# Patient Record
Sex: FEMALE | Race: WHITE | HISPANIC OR LATINO | URBAN - METROPOLITAN AREA
[De-identification: names, ages, dates, MRNs, and addresses within clinical notes are randomized per-mention and may not be internally consistent; named-entity substitution may affect disease eponyms.]

---

## 2019-04-24 ENCOUNTER — EMERGENCY (EMERGENCY)
Facility: HOSPITAL | Age: 29
LOS: 1 days | Discharge: HOME | End: 2019-04-24
Attending: EMERGENCY MEDICINE
Payer: SELF-PAY

## 2019-04-24 VITALS
SYSTOLIC BLOOD PRESSURE: 132 MMHG | HEART RATE: 72 BPM | TEMPERATURE: 98 F | RESPIRATION RATE: 17 BRPM | WEIGHT: 169.98 LBS | HEIGHT: 63 IN | DIASTOLIC BLOOD PRESSURE: 85 MMHG | OXYGEN SATURATION: 98 %

## 2019-04-24 VITALS
HEART RATE: 77 BPM | RESPIRATION RATE: 18 BRPM | SYSTOLIC BLOOD PRESSURE: 122 MMHG | OXYGEN SATURATION: 99 % | TEMPERATURE: 98 F | DIASTOLIC BLOOD PRESSURE: 89 MMHG

## 2019-04-24 DIAGNOSIS — Z98.89 OTHER SPECIFIED POSTPROCEDURAL STATES: Chronic | ICD-10-CM

## 2019-04-24 LAB
ALBUMIN SERPL ELPH-MCNC: 4.6 G/DL — SIGNIFICANT CHANGE UP (ref 3.3–5)
ALP SERPL-CCNC: 38 U/L — LOW (ref 40–120)
ALT FLD-CCNC: 23 U/L — SIGNIFICANT CHANGE UP (ref 10–45)
ANION GAP SERPL CALC-SCNC: 11 MMOL/L — SIGNIFICANT CHANGE UP (ref 5–17)
APPEARANCE UR: ABNORMAL
AST SERPL-CCNC: 22 U/L — SIGNIFICANT CHANGE UP (ref 10–40)
BACTERIA # UR AUTO: NEGATIVE — SIGNIFICANT CHANGE UP
BASOPHILS # BLD AUTO: 0 K/UL — SIGNIFICANT CHANGE UP (ref 0–0.2)
BASOPHILS NFR BLD AUTO: 0 % — SIGNIFICANT CHANGE UP (ref 0–2)
BILIRUB SERPL-MCNC: 0.5 MG/DL — SIGNIFICANT CHANGE UP (ref 0.2–1.2)
BILIRUB UR-MCNC: NEGATIVE — SIGNIFICANT CHANGE UP
BLD GP AB SCN SERPL QL: NEGATIVE — SIGNIFICANT CHANGE UP
BUN SERPL-MCNC: 10 MG/DL — SIGNIFICANT CHANGE UP (ref 7–23)
CALCIUM SERPL-MCNC: 9.2 MG/DL — SIGNIFICANT CHANGE UP (ref 8.4–10.5)
CHLORIDE SERPL-SCNC: 103 MMOL/L — SIGNIFICANT CHANGE UP (ref 96–108)
CO2 SERPL-SCNC: 23 MMOL/L — SIGNIFICANT CHANGE UP (ref 22–31)
COLOR SPEC: ABNORMAL
CREAT SERPL-MCNC: 0.54 MG/DL — SIGNIFICANT CHANGE UP (ref 0.5–1.3)
DIFF PNL FLD: ABNORMAL
EOSINOPHIL # BLD AUTO: 0.1 K/UL — SIGNIFICANT CHANGE UP (ref 0–0.5)
EOSINOPHIL NFR BLD AUTO: 1.1 % — SIGNIFICANT CHANGE UP (ref 0–6)
EPI CELLS # UR: 5 /HPF — SIGNIFICANT CHANGE UP
GLUCOSE SERPL-MCNC: 99 MG/DL — SIGNIFICANT CHANGE UP (ref 70–99)
GLUCOSE UR QL: NEGATIVE — SIGNIFICANT CHANGE UP
HCG SERPL-ACNC: 33.8 MIU/ML — HIGH
HCT VFR BLD CALC: 40.6 % — SIGNIFICANT CHANGE UP (ref 34.5–45)
HGB BLD-MCNC: 14.1 G/DL — SIGNIFICANT CHANGE UP (ref 11.5–15.5)
HYALINE CASTS # UR AUTO: 0 /LPF — SIGNIFICANT CHANGE UP (ref 0–2)
KETONES UR-MCNC: NEGATIVE — SIGNIFICANT CHANGE UP
LEUKOCYTE ESTERASE UR-ACNC: NEGATIVE — SIGNIFICANT CHANGE UP
LYMPHOCYTES # BLD AUTO: 1.7 K/UL — SIGNIFICANT CHANGE UP (ref 1–3.3)
LYMPHOCYTES # BLD AUTO: 25.8 % — SIGNIFICANT CHANGE UP (ref 13–44)
MCHC RBC-ENTMCNC: 30.6 PG — SIGNIFICANT CHANGE UP (ref 27–34)
MCHC RBC-ENTMCNC: 34.7 GM/DL — SIGNIFICANT CHANGE UP (ref 32–36)
MCV RBC AUTO: 88 FL — SIGNIFICANT CHANGE UP (ref 80–100)
MONOCYTES # BLD AUTO: 0.3 K/UL — SIGNIFICANT CHANGE UP (ref 0–0.9)
MONOCYTES NFR BLD AUTO: 5.2 % — SIGNIFICANT CHANGE UP (ref 2–14)
NEUTROPHILS # BLD AUTO: 4.5 K/UL — SIGNIFICANT CHANGE UP (ref 1.8–7.4)
NEUTROPHILS NFR BLD AUTO: 67.9 % — SIGNIFICANT CHANGE UP (ref 43–77)
NITRITE UR-MCNC: NEGATIVE — SIGNIFICANT CHANGE UP
PH UR: 6.5 — SIGNIFICANT CHANGE UP (ref 5–8)
PLATELET # BLD AUTO: 258 K/UL — SIGNIFICANT CHANGE UP (ref 150–400)
POTASSIUM SERPL-MCNC: 4.2 MMOL/L — SIGNIFICANT CHANGE UP (ref 3.5–5.3)
POTASSIUM SERPL-SCNC: 4.2 MMOL/L — SIGNIFICANT CHANGE UP (ref 3.5–5.3)
PROT SERPL-MCNC: 7.4 G/DL — SIGNIFICANT CHANGE UP (ref 6–8.3)
PROT UR-MCNC: ABNORMAL
RBC # BLD: 4.61 M/UL — SIGNIFICANT CHANGE UP (ref 3.8–5.2)
RBC # FLD: 12.2 % — SIGNIFICANT CHANGE UP (ref 10.3–14.5)
RBC CASTS # UR COMP ASSIST: 552 /HPF — HIGH (ref 0–4)
RH IG SCN BLD-IMP: POSITIVE — SIGNIFICANT CHANGE UP
SODIUM SERPL-SCNC: 137 MMOL/L — SIGNIFICANT CHANGE UP (ref 135–145)
SP GR SPEC: 1.03 — HIGH (ref 1.01–1.02)
UROBILINOGEN FLD QL: NEGATIVE — SIGNIFICANT CHANGE UP
WBC # BLD: 6.6 K/UL — SIGNIFICANT CHANGE UP (ref 3.8–10.5)
WBC # FLD AUTO: 6.6 K/UL — SIGNIFICANT CHANGE UP (ref 3.8–10.5)
WBC UR QL: 7 /HPF — HIGH (ref 0–5)

## 2019-04-24 PROCEDURE — 86900 BLOOD TYPING SEROLOGIC ABO: CPT

## 2019-04-24 PROCEDURE — 85027 COMPLETE CBC AUTOMATED: CPT

## 2019-04-24 PROCEDURE — 99284 EMERGENCY DEPT VISIT MOD MDM: CPT

## 2019-04-24 PROCEDURE — 86901 BLOOD TYPING SEROLOGIC RH(D): CPT

## 2019-04-24 PROCEDURE — 80053 COMPREHEN METABOLIC PANEL: CPT

## 2019-04-24 PROCEDURE — 87086 URINE CULTURE/COLONY COUNT: CPT

## 2019-04-24 PROCEDURE — 93975 VASCULAR STUDY: CPT

## 2019-04-24 PROCEDURE — 93975 VASCULAR STUDY: CPT | Mod: 26

## 2019-04-24 PROCEDURE — 99284 EMERGENCY DEPT VISIT MOD MDM: CPT | Mod: 25

## 2019-04-24 PROCEDURE — 86850 RBC ANTIBODY SCREEN: CPT

## 2019-04-24 PROCEDURE — 76817 TRANSVAGINAL US OBSTETRIC: CPT

## 2019-04-24 PROCEDURE — 81001 URINALYSIS AUTO W/SCOPE: CPT

## 2019-04-24 PROCEDURE — 84702 CHORIONIC GONADOTROPIN TEST: CPT

## 2019-04-24 PROCEDURE — 76817 TRANSVAGINAL US OBSTETRIC: CPT | Mod: 26

## 2019-04-24 RX ORDER — ACETAMINOPHEN 500 MG
650 TABLET ORAL ONCE
Qty: 0 | Refills: 0 | Status: COMPLETED | OUTPATIENT
Start: 2019-04-24 | End: 2019-04-24

## 2019-04-24 RX ADMIN — Medication 650 MILLIGRAM(S): at 13:18

## 2019-04-24 RX ADMIN — Medication 650 MILLIGRAM(S): at 15:47

## 2019-04-24 NOTE — ED PROVIDER NOTE - CLINICAL SUMMARY MEDICAL DECISION MAKING FREE TEXT BOX
30 y/o F A1 coming in for abd pain x2days, vaginal bleeding since this AM, w tenderness to llq and suprapubic region, will get US to assess for early ectopic pregnancy and also will get labs w hcg, and ua. pain control, doubt etiology is intra-abd process, most likely to be  gynecology/ obstetrics in pathology.

## 2019-04-24 NOTE — ED PROVIDER NOTE - NSFOLLOWUPINSTRUCTIONS_ED_ALL_ED_FT
1) Please follow-up with your OBGYN or the ED in 2 days to recheck the beta-HCG to evaluate for an ectopic pregnancy.  2) Return to the Emergency Department if you experiences: fevers, chills, abdominal or pelvic pain, vaginal bleeding, dizziness, vomiting, fainting, or symptoms that are new or recurrent.

## 2019-04-24 NOTE — ED PROVIDER NOTE - OBJECTIVE STATEMENT
30 y/o A1 F w pmhx of , pshx of hernia repair as child, cholecystectomy 2 years ago, p/w epigastric pain (resolved), now mid abd pain x2days and vaginal bleeding 'light bleeding w clots' today AM. Pt notes recent positive pregnancy test , LMP 3/19, pt states she has regular menses at baseline. Abd pain described as sharp, intermittent, 7/10 in severity. +2 pads today total. Denies dizziness, lightheadedness, loc, urinary sx, cp, sob, nvd or other complaints. Not on daily meds. NKDA. Did not endorse pain meds today.

## 2019-04-24 NOTE — ED PROVIDER NOTE - CARE PLAN
Principal Discharge DX:	Abdominal pain affecting pregnancy  Secondary Diagnosis:	Vaginal bleeding affecting early pregnancy

## 2019-04-24 NOTE — CONSULT NOTE ADULT - PROBLEM SELECTOR RECOMMENDATION 9
-d/c home with close OB follow up  -Patient to follow up with Dr. Luzma Plascencia ([phone]) in 2 days for repeat bHCG and sono. If she is unable to procure an appt, she will come to the ED.  -ectopic precautions given    All questions answered, patient expressed understanding and was able to repeat plan of care to us.     D/w Dr. Humphries  Pt seen and examined with MARILIA Sage PGY2 and ALFONSO Ortega PGY4  TRE Cortez PGY1

## 2019-04-24 NOTE — ED PROVIDER NOTE - ATTENDING CONTRIBUTION TO CARE
pw vaginal bleeding and cramping today. Denies f/c/n/v/cp/sob/palpitations/ cough/rash/headache/dizziness/abd.pain/d/c/dysuria/hematuria.   abd bening   -->ectopic vs threatened  vs misscarriage--labs sono gyn

## 2019-04-24 NOTE — ED PROVIDER NOTE - NSFOLLOWUPCLINICS_GEN_ALL_ED_FT
[facility] Gynecology [facility] Obstetrics  Gynceology/OB  [address] [place] [address]  Phone: [phone]  Fax:   Follow Up Time:

## 2019-04-24 NOTE — CONSULT NOTE ADULT - ASSESSMENT
30 yo  LMP 3/19/19 p/w +bHCG and vaginal bleeding likely SAB vs pregnancy of unknown location vs early IUP. +bHCG of 33, down from >50 due to + home pregnancy test make SAB or possible ectopic more likely. Patient is hemodynamically stable and has mild pain with benign exam. TVUS shows no IUP, free fluid in pelvis, or ovarian cysts. Blood type A+, no need for Rhogam. Patient counseled that she likely has miscarried and to expect continued bleeding or abdominal pain over next few days which should be less than 2 pads per hour. Patient also counseled on ectopic precautions and management including medication vs surgery. She was also counseled on contraception and safe sex practices.

## 2019-04-24 NOTE — ED PROVIDER NOTE - PROGRESS NOTE DETAILS
Ruben Aguirre MD: results printed and d/w pt. recommended close follow-up with OBGYN for repeat HCG test. also give strict return precautions and signs Ruben Aguirre MD: pt improved symptoms. seen and cleared by OBGYN for discharge and beta-recheck to r/o ectopic. results printed and d/w pt. recommended close follow-up with OBGYN or ED for repeat HCG test in 48 hours. also give strict return precautions and signs

## 2019-04-25 LAB
CULTURE RESULTS: SIGNIFICANT CHANGE UP
SPECIMEN SOURCE: SIGNIFICANT CHANGE UP

## 2019-04-25 NOTE — ED POST DISCHARGE NOTE - ADDITIONAL DOCUMENTATION
Pt called, states someone from the [facility] tried to contact her but she missed the call, no VM left for contact. Chart reviewed, discussed that pt was instructed to come to the ED on friday if unable to make appointment with OB/Gyn. She states she was unable to make appointment with Ob/Gyn for friday for repeat Hcg, discussed to come to the emergency room on friday otherwise call Ob/Gyn office back in case they tried to contact her. No document noted on sunrise regarding missed attempt to call patient. -Jodie Kim PA-C

## 2019-04-26 ENCOUNTER — EMERGENCY (EMERGENCY)
Facility: HOSPITAL | Age: 29
LOS: 1 days | Discharge: HOME | End: 2019-04-26
Attending: EMERGENCY MEDICINE
Payer: MEDICAID

## 2019-04-26 VITALS
DIASTOLIC BLOOD PRESSURE: 80 MMHG | HEART RATE: 77 BPM | WEIGHT: 175.05 LBS | OXYGEN SATURATION: 100 % | TEMPERATURE: 98 F | HEIGHT: 63 IN | SYSTOLIC BLOOD PRESSURE: 135 MMHG | RESPIRATION RATE: 17 BRPM

## 2019-04-26 VITALS
HEART RATE: 58 BPM | OXYGEN SATURATION: 100 % | RESPIRATION RATE: 16 BRPM | TEMPERATURE: 98 F | DIASTOLIC BLOOD PRESSURE: 71 MMHG | SYSTOLIC BLOOD PRESSURE: 115 MMHG

## 2019-04-26 DIAGNOSIS — O03.9 COMPLETE OR UNSPECIFIED SPONTANEOUS ABORTION WITHOUT COMPLICATION: ICD-10-CM

## 2019-04-26 DIAGNOSIS — Z98.89 OTHER SPECIFIED POSTPROCEDURAL STATES: Chronic | ICD-10-CM

## 2019-04-26 LAB
ALBUMIN SERPL ELPH-MCNC: 4.5 G/DL — SIGNIFICANT CHANGE UP (ref 3.3–5)
ALP SERPL-CCNC: 36 U/L — LOW (ref 40–120)
ALT FLD-CCNC: 25 U/L — SIGNIFICANT CHANGE UP (ref 10–45)
ANION GAP SERPL CALC-SCNC: 11 MMOL/L — SIGNIFICANT CHANGE UP (ref 5–17)
AST SERPL-CCNC: 20 U/L — SIGNIFICANT CHANGE UP (ref 10–40)
BASOPHILS # BLD AUTO: 0 K/UL — SIGNIFICANT CHANGE UP (ref 0–0.2)
BASOPHILS NFR BLD AUTO: 0.8 % — SIGNIFICANT CHANGE UP (ref 0–2)
BILIRUB SERPL-MCNC: 0.3 MG/DL — SIGNIFICANT CHANGE UP (ref 0.2–1.2)
BUN SERPL-MCNC: 11 MG/DL — SIGNIFICANT CHANGE UP (ref 7–23)
CALCIUM SERPL-MCNC: 9.3 MG/DL — SIGNIFICANT CHANGE UP (ref 8.4–10.5)
CHLORIDE SERPL-SCNC: 100 MMOL/L — SIGNIFICANT CHANGE UP (ref 96–108)
CO2 SERPL-SCNC: 25 MMOL/L — SIGNIFICANT CHANGE UP (ref 22–31)
CREAT SERPL-MCNC: 0.58 MG/DL — SIGNIFICANT CHANGE UP (ref 0.5–1.3)
EOSINOPHIL # BLD AUTO: 0.1 K/UL — SIGNIFICANT CHANGE UP (ref 0–0.5)
EOSINOPHIL NFR BLD AUTO: 2.6 % — SIGNIFICANT CHANGE UP (ref 0–6)
GLUCOSE SERPL-MCNC: 91 MG/DL — SIGNIFICANT CHANGE UP (ref 70–99)
HCG SERPL-ACNC: 6.4 MIU/ML — HIGH
HCT VFR BLD CALC: 40.8 % — SIGNIFICANT CHANGE UP (ref 34.5–45)
HGB BLD-MCNC: 14 G/DL — SIGNIFICANT CHANGE UP (ref 11.5–15.5)
LYMPHOCYTES # BLD AUTO: 1.7 K/UL — SIGNIFICANT CHANGE UP (ref 1–3.3)
LYMPHOCYTES # BLD AUTO: 32.6 % — SIGNIFICANT CHANGE UP (ref 13–44)
MCHC RBC-ENTMCNC: 30.3 PG — SIGNIFICANT CHANGE UP (ref 27–34)
MCHC RBC-ENTMCNC: 34.4 GM/DL — SIGNIFICANT CHANGE UP (ref 32–36)
MCV RBC AUTO: 88.1 FL — SIGNIFICANT CHANGE UP (ref 80–100)
MONOCYTES # BLD AUTO: 0.3 K/UL — SIGNIFICANT CHANGE UP (ref 0–0.9)
MONOCYTES NFR BLD AUTO: 6.2 % — SIGNIFICANT CHANGE UP (ref 2–14)
NEUTROPHILS # BLD AUTO: 3.1 K/UL — SIGNIFICANT CHANGE UP (ref 1.8–7.4)
NEUTROPHILS NFR BLD AUTO: 57.9 % — SIGNIFICANT CHANGE UP (ref 43–77)
PLATELET # BLD AUTO: 281 K/UL — SIGNIFICANT CHANGE UP (ref 150–400)
POTASSIUM SERPL-MCNC: 4 MMOL/L — SIGNIFICANT CHANGE UP (ref 3.5–5.3)
POTASSIUM SERPL-SCNC: 4 MMOL/L — SIGNIFICANT CHANGE UP (ref 3.5–5.3)
PROT SERPL-MCNC: 7.3 G/DL — SIGNIFICANT CHANGE UP (ref 6–8.3)
RBC # BLD: 4.63 M/UL — SIGNIFICANT CHANGE UP (ref 3.8–5.2)
RBC # FLD: 12.2 % — SIGNIFICANT CHANGE UP (ref 10.3–14.5)
SODIUM SERPL-SCNC: 136 MMOL/L — SIGNIFICANT CHANGE UP (ref 135–145)
WBC # BLD: 5.3 K/UL — SIGNIFICANT CHANGE UP (ref 3.8–10.5)
WBC # FLD AUTO: 5.3 K/UL — SIGNIFICANT CHANGE UP (ref 3.8–10.5)

## 2019-04-26 PROCEDURE — 93975 VASCULAR STUDY: CPT

## 2019-04-26 PROCEDURE — 84702 CHORIONIC GONADOTROPIN TEST: CPT

## 2019-04-26 PROCEDURE — 76817 TRANSVAGINAL US OBSTETRIC: CPT

## 2019-04-26 PROCEDURE — 80053 COMPREHEN METABOLIC PANEL: CPT

## 2019-04-26 PROCEDURE — 85027 COMPLETE CBC AUTOMATED: CPT

## 2019-04-26 PROCEDURE — 99284 EMERGENCY DEPT VISIT MOD MDM: CPT

## 2019-04-26 PROCEDURE — 93975 VASCULAR STUDY: CPT | Mod: 26

## 2019-04-26 PROCEDURE — 76817 TRANSVAGINAL US OBSTETRIC: CPT | Mod: 26

## 2019-04-26 NOTE — ED PROVIDER NOTE - CARE PLAN
Principal Discharge DX:	  Assessment and plan of treatment:	- stay hydrated, take tylenol 975mg every 6 hours as needed for pain   - follow up with your ob/gyn on monday as scheduled.   .- return if symptoms worsen, fevers, chills, worsening pain, worsening bleeding, difficulty ambulating and all other concerns.

## 2019-04-26 NOTE — CONSULT NOTE ADULT - SUBJECTIVE AND OBJECTIVE BOX
29 year old  for follow up [facility]. Patient seen on  with spotting and cramping. No symptoms since the last visit.   Patient is following up with Dr. Plascencia .    Name of GYN Physician: Unknown    POB:  2.5 years ago, +CG prior to laparoscopic cholecystectomy. She then had a D&C outpatient two to three weeks later. Believes she was about 10-12 weeks at that time.     PGyn: Herpes dx'd three years ago by OB, no lesions that she knows of, no antivirals. Denies fibroids, cysts, endometriosis, Abnormal pap smears (last pap 3 years ago). Never used hormonal contraception, condom use inconsistent.    Home meds: None    Allergies: No Known Allergies     PAST MEDICAL & SURGICAL HISTORY:    No pertinent past medical history  No significant past surgical history  H/O hernia repair: age 8      REVIEW OF SYSTEMS  Negative with the exception of the above    MEDICATIONS  (STANDING):    MEDICATIONS  (PRN):      Allergies    No Known Allergies    Intolerances        SOCIAL HISTORY:    FAMILY HISTORY:  No pertinent family history in first degree relatives      Vital Signs Last 24 Hrs  T(C): 36.9 (2019 13:40), Max: 36.9 (2019 12:12)  T(F): 98.4 (2019 13:40), Max: 98.4 (2019 12:12)  HR: 58 (2019 13:40) (58 - 77)  BP: 115/71 (2019 13:40) (115/71 - 135/80)  BP(mean): --  RR: 16 (2019 13:40) (16 - 17)  SpO2: 100% (2019 13:40) (100% - 100%)    PHYSICAL EXAM:      Constitutional: alert and oriented x 3    Breasts: no tenderness, no nodules    Respiratory: clear    Cardiovascular: regular rate and rhythm    Gastrointestinal: soft, non tender, + bowel sounds. No hepatosplenomegaly, no palpable masses    Genitourinary: NEFG  Cervix: closed/ long, no CMT  Uterus: normal size, non tender  Adnexa: non tender, no palpable masses            LABS:                        14.0   5.3   )-----------( 281      ( 2019 13:49 )             40.8     04-    136  |  100  |  11  ----------------------------<  91  4.0   |  25  |  0.58    Ca    9.3      2019 13:49    TPro  7.3  /  Alb  4.5  /  TBili  0.3  /  DBili  x   /  AST  20  /  ALT  25  /  AlkPhos  36<L>      HCG Quantitative, Serum: 6.4: HCG-Quantitative test interpretations: This test is intended only for the  detection & monitoring of pregnancy. For oncology studies order the tumor  marker test “HCG-TM” (hCG-Tumor Marker).  For pregnancy evaluation the reference values are as follows:  Negative:  <=4 mIU/mL  Indeterminate:  5 - 25 mIU/mL (suggest repeat testing in 72 hours)  Positive:  > 25 mIU/mL  Reference Values during Pregnancy:  Weeks after LMP* REFERENCE INTERVAL mIU/mL     3      6 - 71     4      10 - 750     5      220 - 7,100     6      160 - 32,000     7      3,700 - 164,000     8      32,000 - 150,000     9      64,000 - 151,000     10      47,000 - 187,000     12      28,000 - 211,000     14      14,000 - 63,000     15      12,000 - 71,000     16 - 18  8,000 - 58,000  * LMP:  Last Menstrual Period  HCG results of false positive and false negative for pregnancy are rare,  but can occur with this, and other, hCG tests. Marisol- and post-menopausal  females may have mildly elevated hCG concentrations usually less than 14  mIU/mL that are constant over time. mIU/mL (19 @ 13:49)              RADIOLOGY & ADDITIONAL STUDIES:

## 2019-04-26 NOTE — ED PROVIDER NOTE - PHYSICAL EXAMINATION
A&Ox3, NAD. NCAT. PERRL, EOMI. Neck supple, no LAD. Lungs CTAB. +S1S2, RRR, No m/r/g. Abd soft, minimal left lower abdominal tenderness to palpation, ND, +BS, no rebound or guarding. Extremities: cap refill <2, pulses in distal extremities 4+, no edema. Skin without rash. CN II-XII intact. Strength 5/5 UE/LE. Sensations intact throughout. Gait steady. A&Ox3, NAD. NCAT. PERRL, EOMI. Neck supple, no LAD. Lungs CTAB. +S1S2, RRR, No m/r/g. Abd soft, minimal left lower abdominal tenderness to palpation, ND, +BS, no rebound or guarding. Extremities: cap refill <2, pulses in distal extremities 4+, no edema. Skin without rash. CN II-XII intact. Strength 5/5 UE/LE. Sensations intact throughout. Gait steady. Gu exam deferred, pt evaluated by ob/gyn.

## 2019-04-26 NOTE — ED ADULT TRIAGE NOTE - CHIEF COMPLAINT QUOTE
patient seen here 2 days ago for vaginal bleeding (possible ectopic), could not get OB appointment until Monday. as per patient, was told by ED MD to come to ED if unable to get an appointment for today.

## 2019-04-26 NOTE — ED PROVIDER NOTE - NSFOLLOWUPINSTRUCTIONS_ED_ALL_ED_FT
- stay hydrated, take tylenol 975mg every 6 hours as needed for pain   - follow up with your ob/gyn on monday as scheduled.   .- return if symptoms worsen, fevers, chills, worsening pain, worsening bleeding, difficulty ambulating and all other concerns.

## 2019-04-26 NOTE — ED PROVIDER NOTE - OBJECTIVE STATEMENT
30 y/o A1 F w pmhx of , pshx of hernia repair as child, cholecystectomy 2 years ago, presenting for repeat Hcg and vaginal sonogram after noted elevated hcg 2 days ago with abdominal pain and vginal bleeding. Pt notes recent positive pregnancy test , LMP 3/19, 30 y/o A1 F w pmhx of , pshx of hernia repair as child, cholecystectomy 2 years ago, presenting for 2 day ectopic rule out, repeat Hcg and vaginal sonogram after noted elevated hcg 2 days ago with abdominal pain and vaginal bleeding. Pt notes recent positive pregnancy test , LMP 3/19, had epigastric and lower abdominal cramping with associated vaginal bleeding starting 4-5 days ago. since last visit, abd cramping and vaginal bleeding persistent however much reduced. no fevers, chills ,nausea, vomiting, diarrhea, constipation, no dizziness, lightheadedness, has not taken anything for pain, has appt with ob dr. osuna on .

## 2019-04-26 NOTE — ED PROVIDER NOTE - CARE PROVIDER_API CALL
Luzma Plascencia)  OBSN  General  [address] [address]  Phone: [phone]  Fax: [phone]  Follow Up Time: 1-3 Days

## 2019-04-26 NOTE — ED PROVIDER NOTE - ATTENDING CONTRIBUTION TO CARE
29F, , seen at ED 2 days ago for possible ectopic vs sab, returns for repeat bloodwork and us. pt was having lower abd cramping, vag bleeding which have both improved. now using only ~2 pads/day. denies f/c, n/v, lightheadedness, dizziness, cp, sob, or any other complaints.    PE: Well appearing young female in NAD, NCAT, MMM, Trachea midline, Normal conjunctiva, lungs CTAB, S1/S2 RRR, Normal perfusion, 2+ radial pulses bilat, Abdomen Soft, Minimal suprapubic ttp, No rebound/guarding, No LE edema, No deformity of extremities, No rashes,  No focal motor or sensory deficits.     29F returns for TVUS and repeat b-hcg. sx of abd cramping, vag bleeding improving. pt feels well. to perform bloodwork, check tvus, consult obgyn. - Rashi Avelar MD

## 2019-04-26 NOTE — CONSULT NOTE ADULT - PROBLEM SELECTOR RECOMMENDATION 9
Patient counseled that with decreasing BHCG she likely has an SAB. Patient will follow up Monday at [address].     dw Dr. Temo Ortega PGY4

## 2019-04-26 NOTE — ED ADULT NURSE NOTE - OBJECTIVE STATEMENT
29 year old A&Ox3 female  presents ambulatory with steady gait to ED complaining of abdominal pain. Patient seen here 2 days ago, was told "I might have been having an ectopic pregnancy." Patient was dc and told to follow up with OB but if she could not get an appointment to come back to the ED for repeat HCG levels. Patient complaining of minimal lower abdominal pain, declines pain medication at this time. Confirms using 2 pads per day, with some clots, states, "it is was less than before." LMP . Denies dizziness, lightheadedness, fevers, chills, CP, SOB, urinary symptoms, n/v/d, numbness tingling in upper and lower extremities, HA, blurry vision. VSS updated on plan of care.

## 2019-04-28 ENCOUNTER — LABORATORY RESULT (OUTPATIENT)
Age: 29
End: 2019-04-28

## 2019-04-29 ENCOUNTER — APPOINTMENT (OUTPATIENT)
Dept: OBGYN | Facility: CLINIC | Age: 29
End: 2019-04-29
Payer: MEDICAID

## 2019-04-29 ENCOUNTER — OUTPATIENT (OUTPATIENT)
Dept: OUTPATIENT SERVICES | Facility: HOSPITAL | Age: 29
LOS: 1 days | End: 2019-04-29
Payer: MEDICAID

## 2019-04-29 VITALS — SYSTOLIC BLOOD PRESSURE: 100 MMHG | WEIGHT: 173 LBS | DIASTOLIC BLOOD PRESSURE: 67 MMHG

## 2019-04-29 DIAGNOSIS — Z98.89 OTHER SPECIFIED POSTPROCEDURAL STATES: Chronic | ICD-10-CM

## 2019-04-29 PROCEDURE — 99203 OFFICE O/P NEW LOW 30 MIN: CPT | Mod: NC

## 2019-04-29 PROCEDURE — 88141 CYTOPATH C/V INTERPRET: CPT

## 2019-04-30 DIAGNOSIS — O03.9 COMPLETE OR UNSPECIFIED SPONTANEOUS ABORTION WITHOUT COMPLICATION: ICD-10-CM

## 2019-04-30 LAB
C TRACH RRNA SPEC QL NAA+PROBE: SIGNIFICANT CHANGE UP
HCG SERPL-ACNC: 1 MIU/ML — SIGNIFICANT CHANGE UP
N GONORRHOEA RRNA SPEC QL NAA+PROBE: SIGNIFICANT CHANGE UP
SPECIMEN SOURCE: SIGNIFICANT CHANGE UP

## 2019-05-01 NOTE — HISTORY OF PRESENT ILLNESS
[Good] : being in good health [Healthy Diet] : a healthy diet [Regular Exercise] : regular exercise [Definite:  ___ (Date)] : the last menstrual period was [unfilled] [Normal Amount/Duration] : was of a normal amount and duration [Regular Cycle Intervals] : periods have been regular [Sexually Active] : is sexually active [Monogamous] : is monogamous [Male ___] : [unfilled] male [Spotting Between  Menses] : no spotting between menses

## 2019-05-02 ENCOUNTER — LABORATORY RESULT (OUTPATIENT)
Age: 29
End: 2019-05-02

## 2019-05-02 LAB — CYTOLOGY SPEC DOC CYTO: SIGNIFICANT CHANGE UP

## 2019-05-02 PROCEDURE — 88175 CYTOPATH C/V AUTO FLUID REDO: CPT

## 2019-05-02 PROCEDURE — G0463: CPT

## 2019-05-02 PROCEDURE — 87491 CHLMYD TRACH DNA AMP PROBE: CPT

## 2019-05-02 PROCEDURE — 87624 HPV HI-RISK TYP POOLED RSLT: CPT

## 2019-05-02 PROCEDURE — 87591 N.GONORRHOEAE DNA AMP PROB: CPT

## 2019-05-02 PROCEDURE — 36415 COLL VENOUS BLD VENIPUNCTURE: CPT

## 2019-05-02 PROCEDURE — 84702 CHORIONIC GONADOTROPIN TEST: CPT

## 2019-05-14 ENCOUNTER — APPOINTMENT (OUTPATIENT)
Dept: OBGYN | Facility: CLINIC | Age: 29
End: 2019-05-14

## 2019-05-15 ENCOUNTER — OUTPATIENT (OUTPATIENT)
Dept: OUTPATIENT SERVICES | Facility: HOSPITAL | Age: 29
LOS: 1 days | End: 2019-05-15
Payer: MEDICAID

## 2019-05-15 ENCOUNTER — RESULT CHARGE (OUTPATIENT)
Age: 29
End: 2019-05-15

## 2019-05-15 ENCOUNTER — APPOINTMENT (OUTPATIENT)
Dept: OBGYN | Facility: CLINIC | Age: 29
End: 2019-05-15
Payer: MEDICAID

## 2019-05-15 ENCOUNTER — LABORATORY RESULT (OUTPATIENT)
Age: 29
End: 2019-05-15

## 2019-05-15 DIAGNOSIS — Z00.00 ENCOUNTER FOR GENERAL ADULT MEDICAL EXAMINATION W/OUT ABNORMAL FINDINGS: ICD-10-CM

## 2019-05-15 DIAGNOSIS — Z98.89 OTHER SPECIFIED POSTPROCEDURAL STATES: Chronic | ICD-10-CM

## 2019-05-15 DIAGNOSIS — N76.0 ACUTE VAGINITIS: ICD-10-CM

## 2019-05-15 LAB
HCG UR QL: NEGATIVE
QUALITY CONTROL: YES

## 2019-05-15 PROCEDURE — 57421 EXAM/BIOPSY OF VAG W/SCOPE: CPT | Mod: GC

## 2019-05-15 PROCEDURE — 57421 EXAM/BIOPSY OF VAG W/SCOPE: CPT

## 2019-05-15 NOTE — PROCEDURE
[Colposcopy] : colposcopy [ASCUS] : atypical squamous cells of undetermined significance [HPV high risk] : PCR positive for high risk HPV [Patient] : patient [Risks] : risks [Benefits] : benefits [Alternatives] : alternatives [Bleeding] : bleeding [Infection] : infection [No Abnormalities] : no abnormalities [Acetowhite ___ o'clock] : ascetowhite changes at [unfilled] ~Uo'clock [Consent Obtained] : written consent was obtained prior to the procedure [Biopsies Taken: # ___] : [unfilled] biopsies taken of the cervix [Biopsy Locations ___ o'clock] : the biopsies were taken at [unfilled] o'clock [Ted's] : Ted's solution [Tolerated Well] : the patient tolerated the procedure well [No Complications] : there were no complications

## 2019-05-16 NOTE — ASSESSMENT
[FreeTextEntry1] : 28yo with ASCUS +HPV. Biopsy taken at 11, 2, and 6.\par \par seen with Dr. Salinas\par gchow pgy4

## 2019-05-24 DIAGNOSIS — R87.610 ATYPICAL SQUAMOUS CELLS OF UNDETERMINED SIGNIFICANCE ON CYTOLOGIC SMEAR OF CERVIX (ASC-US): ICD-10-CM

## 2019-06-05 ENCOUNTER — RESULT CHARGE (OUTPATIENT)
Age: 29
End: 2019-06-05

## 2019-06-05 ENCOUNTER — LABORATORY RESULT (OUTPATIENT)
Age: 29
End: 2019-06-05

## 2019-06-05 ENCOUNTER — OUTPATIENT (OUTPATIENT)
Dept: OUTPATIENT SERVICES | Facility: HOSPITAL | Age: 29
LOS: 1 days | End: 2019-06-05
Payer: MEDICAID

## 2019-06-05 ENCOUNTER — APPOINTMENT (OUTPATIENT)
Dept: OBGYN | Facility: CLINIC | Age: 29
End: 2019-06-05
Payer: MEDICAID

## 2019-06-05 DIAGNOSIS — R87.810 ATYPICAL SQUAMOUS CELLS OF UNDETERMINED SIGNIFICANCE ON CYTOLOGIC SMEAR OF CERVIX (ASC-US): ICD-10-CM

## 2019-06-05 DIAGNOSIS — Z98.89 OTHER SPECIFIED POSTPROCEDURAL STATES: Chronic | ICD-10-CM

## 2019-06-05 DIAGNOSIS — N76.0 ACUTE VAGINITIS: ICD-10-CM

## 2019-06-05 DIAGNOSIS — R87.610 ATYPICAL SQUAMOUS CELLS OF UNDETERMINED SIGNIFICANCE ON CYTOLOGIC SMEAR OF CERVIX (ASC-US): ICD-10-CM

## 2019-06-05 LAB
HCG UR QL: NEGATIVE
QUALITY CONTROL: YES

## 2019-06-05 PROCEDURE — 57460 BX OF CERVIX W/SCOPE LEEP: CPT

## 2019-06-05 PROCEDURE — 57460 BX OF CERVIX W/SCOPE LEEP: CPT | Mod: GC

## 2019-06-05 NOTE — PROCEDURE
[Colposcopy With Loop Electrode Excision Of The Cervix] : colposcopy with loop electrode excision of the cervix [Risks] : risks [Benefits] : benefits [Patient] : patient [Infection] : infection [Bleeding] : bleeding [CONSENT OBTAINED] : written consent was obtained prior to the procedure. [Neg Pregnancy Test] : a pregnancy test was negative [No Premedication] : No premedication [No Abnormalities] : no abnormalities seen [Cutting Setting  ___ thomson] : the cutting setting was [unfilled] thomson [Coagulation] : ball electrode using coagulation [Sent to Path] : the excised lesion was place in buffered formalin and sent for pathlogy [Tolerated Well] : the patient tolerated the procedure well [No Complications] : there were no complications [de-identified] : HONEY 2

## 2019-06-05 NOTE — ASSESSMENT
[FreeTextEntry1] : 28yo with ASCUS +HPV, s/p colpo with path showing CIN2. LEEP performed today.\par \par seen with Dr. Salinas\par Юлия R4

## 2019-06-11 DIAGNOSIS — N87.1 MODERATE CERVICAL DYSPLASIA: ICD-10-CM

## 2019-06-18 ENCOUNTER — APPOINTMENT (OUTPATIENT)
Dept: OBGYN | Facility: CLINIC | Age: 29
End: 2019-06-18

## 2019-06-20 ENCOUNTER — APPOINTMENT (OUTPATIENT)
Dept: OBGYN | Facility: CLINIC | Age: 29
End: 2019-06-20

## 2019-06-24 ENCOUNTER — APPOINTMENT (OUTPATIENT)
Dept: OBGYN | Facility: CLINIC | Age: 29
End: 2019-06-24

## 2019-07-28 NOTE — ED ADULT TRIAGE NOTE - PAIN: PRESENCE, MLM
[FreeTextEntry1] : General: Patient is awake and alert and in no acute distress. oriented to person, place, and time. well developed, well nourished, cooperative. \par \par Skin: The skin is intact, warm, pink, and dry over the area examined. \par \par Eyes: normal conjunctiva, normal eyelids and pupils were equal and round. \par \par ENT: normal ears, normal nose and normal lips.\par \par Cardiovascular: There is brisk capillary refill in the digits of the affected extremity. They are symmetric pulses in the bilateral upper and lower extremities, positive peripheral pulses, brisk capillary refill, but no peripheral edema.\par \par Respiratory: The patient is in no apparent respiratory distress. They're taking full deep breaths without use of accessory muscles or evidence of audible wheezes or stridor without the use of a stethoscope, normal respiratory effort. \par \par Examination of both the upper and lower extremities did not show any obvious abnormality. There is no gross deformity. Patient has full range of motion of both the hips, knees, ankles, wrists, elbows, and shoulders. Neck range of motion is full and free without any pain or spasm. \par \par Examination of the back reveals shoulder asymmetry with left shoulder slightly higher than right. The pelvis is slightly asymmetric. On forward bending Romero test, the ATR measures 7 degrees. Patient is able to bend forward and touch the toes as well bend backwards without pain. Lateral flexion is symmetrical and is pain free. Straight leg raising test is free to more than 70 degrees. \par \par Neurological examination reveals a grade 5/5 muscle power. Sensation is intact to crude touch and pinprick. Deep tendon reflexes are 1+ with ankle jerk and knee jerk. The plantars are bilaterally down going. Superficial abdominal reflexes are symmetric and intact. The biceps and triceps reflexes are 1+. \par  \par There is no hairy patch, lipoma, sinus in the back. There is no pes cavus, asymmetry of calves, significant leg length discrepancy or significant cafe-au-lait spots.\par \par Child is able to walk on tiptoes as well as heels without difficulty or pain. Child is able to jump and squat without difficulty.\par \par L shoulder\par No sweling\par Generalized discomfort with ROM\par Equivocal apprehension - more for discomfort than instability \par Negative sulcus sign\par 5/5 strength, RP 2+, Brisk cap refill\par NVI r/u/m/ain distribution complains of pain/discomfort

## 2019-09-16 ENCOUNTER — APPOINTMENT (OUTPATIENT)
Dept: OBGYN | Facility: CLINIC | Age: 29
End: 2019-09-16

## 2020-02-10 ENCOUNTER — APPOINTMENT (OUTPATIENT)
Dept: OBGYN | Facility: CLINIC | Age: 30
End: 2020-02-10

## 2020-08-14 ENCOUNTER — EMERGENCY (EMERGENCY)
Facility: HOSPITAL | Age: 30
LOS: 1 days | Discharge: HOME | End: 2020-08-14
Attending: PERSONAL EMERGENCY RESPONSE ATTENDANT
Payer: MEDICAID

## 2020-08-14 VITALS
HEART RATE: 55 BPM | TEMPERATURE: 99 F | RESPIRATION RATE: 16 BRPM | OXYGEN SATURATION: 100 % | DIASTOLIC BLOOD PRESSURE: 90 MMHG | WEIGHT: 184.97 LBS | HEIGHT: 63 IN | SYSTOLIC BLOOD PRESSURE: 143 MMHG

## 2020-08-14 DIAGNOSIS — Z98.89 OTHER SPECIFIED POSTPROCEDURAL STATES: Chronic | ICD-10-CM

## 2020-08-14 LAB
ALBUMIN SERPL ELPH-MCNC: 5 G/DL — SIGNIFICANT CHANGE UP (ref 3.3–5)
ALP SERPL-CCNC: 42 U/L — SIGNIFICANT CHANGE UP (ref 40–120)
ALT FLD-CCNC: 69 U/L — HIGH (ref 10–45)
ANION GAP SERPL CALC-SCNC: 13 MMOL/L — SIGNIFICANT CHANGE UP (ref 5–17)
APPEARANCE UR: ABNORMAL
AST SERPL-CCNC: 40 U/L — SIGNIFICANT CHANGE UP (ref 10–40)
BACTERIA # UR AUTO: NEGATIVE — SIGNIFICANT CHANGE UP
BASOPHILS # BLD AUTO: 0.01 K/UL — SIGNIFICANT CHANGE UP (ref 0–0.2)
BASOPHILS NFR BLD AUTO: 0.1 % — SIGNIFICANT CHANGE UP (ref 0–2)
BILIRUB SERPL-MCNC: 0.5 MG/DL — SIGNIFICANT CHANGE UP (ref 0.2–1.2)
BILIRUB UR-MCNC: NEGATIVE — SIGNIFICANT CHANGE UP
BUN SERPL-MCNC: 11 MG/DL — SIGNIFICANT CHANGE UP (ref 7–23)
CALCIUM SERPL-MCNC: 10 MG/DL — SIGNIFICANT CHANGE UP (ref 8.4–10.5)
CHLORIDE SERPL-SCNC: 101 MMOL/L — SIGNIFICANT CHANGE UP (ref 96–108)
CO2 SERPL-SCNC: 23 MMOL/L — SIGNIFICANT CHANGE UP (ref 22–31)
COLOR SPEC: YELLOW — SIGNIFICANT CHANGE UP
CREAT SERPL-MCNC: 0.65 MG/DL — SIGNIFICANT CHANGE UP (ref 0.5–1.3)
DIFF PNL FLD: NEGATIVE — SIGNIFICANT CHANGE UP
EOSINOPHIL # BLD AUTO: 0.06 K/UL — SIGNIFICANT CHANGE UP (ref 0–0.5)
EOSINOPHIL NFR BLD AUTO: 0.8 % — SIGNIFICANT CHANGE UP (ref 0–6)
EPI CELLS # UR: 5 /HPF — SIGNIFICANT CHANGE UP
GLUCOSE SERPL-MCNC: 92 MG/DL — SIGNIFICANT CHANGE UP (ref 70–99)
GLUCOSE UR QL: NEGATIVE — SIGNIFICANT CHANGE UP
HCG SERPL-ACNC: <2 MIU/ML — SIGNIFICANT CHANGE UP
HCG UR QL: NEGATIVE — SIGNIFICANT CHANGE UP
HCT VFR BLD CALC: 45 % — SIGNIFICANT CHANGE UP (ref 34.5–45)
HGB BLD-MCNC: 14.9 G/DL — SIGNIFICANT CHANGE UP (ref 11.5–15.5)
HYALINE CASTS # UR AUTO: 3 /LPF — HIGH (ref 0–2)
IMM GRANULOCYTES NFR BLD AUTO: 0.3 % — SIGNIFICANT CHANGE UP (ref 0–1.5)
KETONES UR-MCNC: NEGATIVE — SIGNIFICANT CHANGE UP
LEUKOCYTE ESTERASE UR-ACNC: NEGATIVE — SIGNIFICANT CHANGE UP
LIDOCAIN IGE QN: 25 U/L — SIGNIFICANT CHANGE UP (ref 7–60)
LYMPHOCYTES # BLD AUTO: 2.2 K/UL — SIGNIFICANT CHANGE UP (ref 1–3.3)
LYMPHOCYTES # BLD AUTO: 29.9 % — SIGNIFICANT CHANGE UP (ref 13–44)
MCHC RBC-ENTMCNC: 29.3 PG — SIGNIFICANT CHANGE UP (ref 27–34)
MCHC RBC-ENTMCNC: 33.1 GM/DL — SIGNIFICANT CHANGE UP (ref 32–36)
MCV RBC AUTO: 88.4 FL — SIGNIFICANT CHANGE UP (ref 80–100)
MONOCYTES # BLD AUTO: 0.4 K/UL — SIGNIFICANT CHANGE UP (ref 0–0.9)
MONOCYTES NFR BLD AUTO: 5.4 % — SIGNIFICANT CHANGE UP (ref 2–14)
NEUTROPHILS # BLD AUTO: 4.66 K/UL — SIGNIFICANT CHANGE UP (ref 1.8–7.4)
NEUTROPHILS NFR BLD AUTO: 63.5 % — SIGNIFICANT CHANGE UP (ref 43–77)
NITRITE UR-MCNC: NEGATIVE — SIGNIFICANT CHANGE UP
NRBC # BLD: 0 /100 WBCS — SIGNIFICANT CHANGE UP (ref 0–0)
PH UR: 6 — SIGNIFICANT CHANGE UP (ref 5–8)
PLATELET # BLD AUTO: 267 K/UL — SIGNIFICANT CHANGE UP (ref 150–400)
POTASSIUM SERPL-MCNC: 4 MMOL/L — SIGNIFICANT CHANGE UP (ref 3.5–5.3)
POTASSIUM SERPL-SCNC: 4 MMOL/L — SIGNIFICANT CHANGE UP (ref 3.5–5.3)
PROT SERPL-MCNC: 8 G/DL — SIGNIFICANT CHANGE UP (ref 6–8.3)
PROT UR-MCNC: SIGNIFICANT CHANGE UP
RBC # BLD: 5.09 M/UL — SIGNIFICANT CHANGE UP (ref 3.8–5.2)
RBC # FLD: 12.6 % — SIGNIFICANT CHANGE UP (ref 10.3–14.5)
RBC CASTS # UR COMP ASSIST: 10 /HPF — HIGH (ref 0–4)
SODIUM SERPL-SCNC: 137 MMOL/L — SIGNIFICANT CHANGE UP (ref 135–145)
SP GR SPEC: 1.03 — HIGH (ref 1.01–1.02)
UROBILINOGEN FLD QL: NEGATIVE — SIGNIFICANT CHANGE UP
WBC # BLD: 7.35 K/UL — SIGNIFICANT CHANGE UP (ref 3.8–10.5)
WBC # FLD AUTO: 7.35 K/UL — SIGNIFICANT CHANGE UP (ref 3.8–10.5)
WBC UR QL: 1 /HPF — SIGNIFICANT CHANGE UP (ref 0–5)

## 2020-08-14 PROCEDURE — 81025 URINE PREGNANCY TEST: CPT

## 2020-08-14 PROCEDURE — 99285 EMERGENCY DEPT VISIT HI MDM: CPT

## 2020-08-14 PROCEDURE — 74177 CT ABD & PELVIS W/CONTRAST: CPT | Mod: 26

## 2020-08-14 PROCEDURE — 99284 EMERGENCY DEPT VISIT MOD MDM: CPT | Mod: 25

## 2020-08-14 PROCEDURE — 80053 COMPREHEN METABOLIC PANEL: CPT

## 2020-08-14 PROCEDURE — 81001 URINALYSIS AUTO W/SCOPE: CPT

## 2020-08-14 PROCEDURE — 76700 US EXAM ABDOM COMPLETE: CPT

## 2020-08-14 PROCEDURE — 74177 CT ABD & PELVIS W/CONTRAST: CPT

## 2020-08-14 PROCEDURE — 85027 COMPLETE CBC AUTOMATED: CPT

## 2020-08-14 PROCEDURE — 84702 CHORIONIC GONADOTROPIN TEST: CPT

## 2020-08-14 PROCEDURE — 96374 THER/PROPH/DIAG INJ IV PUSH: CPT | Mod: XU

## 2020-08-14 PROCEDURE — 76700 US EXAM ABDOM COMPLETE: CPT | Mod: 26

## 2020-08-14 PROCEDURE — 83690 ASSAY OF LIPASE: CPT

## 2020-08-14 RX ORDER — SODIUM CHLORIDE 9 MG/ML
1000 INJECTION INTRAMUSCULAR; INTRAVENOUS; SUBCUTANEOUS ONCE
Refills: 0 | Status: COMPLETED | OUTPATIENT
Start: 2020-08-14 | End: 2020-08-14

## 2020-08-14 RX ORDER — ACETAMINOPHEN 500 MG
975 TABLET ORAL ONCE
Refills: 0 | Status: COMPLETED | OUTPATIENT
Start: 2020-08-14 | End: 2020-08-14

## 2020-08-14 RX ORDER — MORPHINE SULFATE 50 MG/1
4 CAPSULE, EXTENDED RELEASE ORAL ONCE
Refills: 0 | Status: DISCONTINUED | OUTPATIENT
Start: 2020-08-14 | End: 2020-08-14

## 2020-08-14 RX ORDER — ONDANSETRON 8 MG/1
4 TABLET, FILM COATED ORAL ONCE
Refills: 0 | Status: COMPLETED | OUTPATIENT
Start: 2020-08-14 | End: 2020-08-14

## 2020-08-14 RX ADMIN — Medication 975 MILLIGRAM(S): at 21:02

## 2020-08-14 RX ADMIN — ONDANSETRON 4 MILLIGRAM(S): 8 TABLET, FILM COATED ORAL at 19:38

## 2020-08-14 RX ADMIN — SODIUM CHLORIDE 1000 MILLILITER(S): 9 INJECTION INTRAMUSCULAR; INTRAVENOUS; SUBCUTANEOUS at 21:02

## 2020-08-14 RX ADMIN — Medication 975 MILLIGRAM(S): at 20:11

## 2020-08-14 RX ADMIN — SODIUM CHLORIDE 1000 MILLILITER(S): 9 INJECTION INTRAMUSCULAR; INTRAVENOUS; SUBCUTANEOUS at 19:37

## 2020-08-14 NOTE — ED ADULT NURSE NOTE - EXPLANATION OF PATIENT'S REASON FOR LEAVING
Pt lives in [place]. Pt friend come to pick pt up. Pt unable to wait any longer. MD Bowman explained risks and benefits to leaving. Pt verbalized understanding.

## 2020-08-14 NOTE — ED PROVIDER NOTE - ADDITIONAL NOTES AND INSTRUCTIONS:
Please excuse Ms. Staton from work on 8/14 and 8/15 as she required emergency medical care.  Thank you in advance for your understanding in this matter.  She is medically cleared to return to work on or after 8/16 as tolerated.

## 2020-08-14 NOTE — ED PROVIDER NOTE - ATTENDING CONTRIBUTION TO CARE
Attending MD Bowman.  Agree with above.  Pt with limited PMHx s/p cholecystectomy with RUQ pain radiating to back with vague nausea.  Pt with focal TTP over RUQ.  Concern for stone in CBD vs SBO.  Planned sono/CTAP. Attending MD Bowman.  Agree with above.  PT is a 31 yo female with pmhx of cholecystectomy 3 yrs ago and remote hernia repair as a child who presents to ED with complaint of RUQ pain since 2300 last night.  Pain intermittent to LUQ and back assoc with nausea.  No assoc fevers/vomiting.  Since surg 3 yrs ago has chronic diarrhea 5-6x/day without changes.  Pt does not believe she is pregnant.  Planned blood work including lipase and imaging.  Pt with limited PMHx s/p cholecystectomy with RUQ pain radiating to back with vague nausea.  Pt with focal TTP over RUQ.  Concern for stone in CBD vs SBO.  Planned sono/CTAP.

## 2020-08-14 NOTE — ED PROVIDER NOTE - PATIENT PORTAL LINK FT
You can access the FollowMyHealth Patient Portal offered by [facility] by registering at the following website: [email or web address]. By joining [employer]’s FollowMyHealth portal, you will also be able to view your health information using other applications (apps) compatible with our system.

## 2020-08-14 NOTE — ED ADULT NURSE NOTE - OBJECTIVE STATEMENT
30 y F with a PMhx of gallbladder removal 3 years ago presents to the ED with RUQ abdominal pain since 11pm last night. Reports that she has some nausea. She reports that her pain is constant. Reports watery diarrhea since she had her gallbladder removed. Denies recent antibiotic use. On assessment, A&Ox3. Denies lightheadedness, dizziness, headaches, numbness and tingling. Breathing spontaneously and unlabored. Sating 99% on Room air. Denies cough, SOB and CP. S1 and S2 heard. No Peripheral edema. Cap refill 2s. No JVD. Peripheral pulses strong and equal bilaterally. Denies CP, SOB and palpitations. Abdomen soft, nondistended, negative CVA tenderness, positive bowel sounds in all four quadrants. Pt is continent. Denies dysuria, melena and hematuria. IV placed 20g in LAC. Labs drawn and sent. Pt safety maintained. Call bell within reach. Side rails in upward position. Pt awaiting dispo.

## 2020-08-14 NOTE — ED ADULT TRIAGE NOTE - CHIEF COMPLAINT QUOTE
RUQ abd pain radiating to middle back since 11 PM last night. hx cholecystectomy. + nausea/diarrhea.

## 2020-08-14 NOTE — ED PROVIDER NOTE - NSFOLLOWUPINSTRUCTIONS_ED_ALL_ED_FT
1.  Please follow-up with your primary care doctor in 1-3 days for reassessment.   2.  Your labs and ultrasound did not demonstrate significantly concerning findings, your CT scan has not resulted at the time of discharge.  You may not receive a call this evening if you have left the department before results return.  You are welcome to call tomorrow to attempt to obtain results.    3.  Discharge against medical advise does not change your ability to be seen in the future.  You are more than welcome to return to the ER should you change your mind and wish to complete or continue your care or should your symptoms change in any way.

## 2020-08-14 NOTE — ED ADULT NURSE NOTE - NSIMPLEMENTINTERV_GEN_ALL_ED
Implemented All Universal Safety Interventions:  [place] to call system. Call bell, personal items and telephone within reach. Instruct patient to call for assistance. Room bathroom lighting operational. Non-slip footwear when patient is off stretcher. Physically safe environment: no spills, clutter or unnecessary equipment. Stretcher in lowest position, wheels locked, appropriate side rails in place.

## 2020-08-14 NOTE — ED PROVIDER NOTE - PROGRESS NOTE DETAILS
Attending MD Bowman.  PT stating that she would like to leave.  I have reviewed results of labs/sono that are inconsistent with acutely actionable findings however pt has a CT pending which has not yet been read.  She has been made aware of risks of missed findings that might change her management and could be important for her safety. She verbalizes understanding of this but states she would like to go and can call tomorrow for CT results.  Pt advised that even if there are significant findings this evening she may not receive a call if she is not in the department depending on when read results.  She has decisional capacity, is A & O x 4.  Pt counseled re: need to return should she change her mind and wish to complete her care as well as need to return if sxs should change or worsen.  Pt comfortable with this and requests discharge AMA.

## 2020-08-15 VITALS
SYSTOLIC BLOOD PRESSURE: 120 MMHG | DIASTOLIC BLOOD PRESSURE: 82 MMHG | OXYGEN SATURATION: 99 % | RESPIRATION RATE: 18 BRPM | HEART RATE: 56 BPM

## 2020-08-15 NOTE — ED POST DISCHARGE NOTE - ADDITIONAL DOCUMENTATION
pt called for CT scan results. pt informed of results, and addressed all questions/concerns. pt is no longer having diarrhea but still has some upper abdominal pain. pt told to return if pain worsens, fever, inability to eat/drink, and all other concerns.  pt also instructed to f/up with a Gastroenterologist. pt understands and agrees.

## 2020-11-03 NOTE — ED ADULT TRIAGE NOTE - CCCP TRG CHIEF CMPLNT
Ongoing SW/CM Assessment/Plan of Care Note     See SW/CM flowsheets for goals and other objective data.    Patient/Family discharge goal (s): AIR placement  Goal #1: Psychosocial needs assessed     Progress note: SW called directly into pt's room and spoke w/ pt. AIR placement explained and pt agreeable for SW to make referral [facility]. Referral sent via ecin to [facility]. Per pt's request, SW covering for pt's fiance alerted that pt wants fiance to also go to [facility] if AIR recommended.   Update: BRENDAN received contact info for ins cm at [phone] if needed.           abdominal pain

## 2020-12-26 ENCOUNTER — EMERGENCY (EMERGENCY)
Facility: HOSPITAL | Age: 30
LOS: 1 days | Discharge: HOME | End: 2020-12-26
Attending: EMERGENCY MEDICINE
Payer: MEDICAID

## 2020-12-26 VITALS
WEIGHT: 177.91 LBS | RESPIRATION RATE: 17 BRPM | SYSTOLIC BLOOD PRESSURE: 122 MMHG | HEIGHT: 63 IN | TEMPERATURE: 99 F | HEART RATE: 71 BPM | DIASTOLIC BLOOD PRESSURE: 84 MMHG | OXYGEN SATURATION: 95 %

## 2020-12-26 VITALS
RESPIRATION RATE: 18 BRPM | TEMPERATURE: 99 F | HEART RATE: 57 BPM | OXYGEN SATURATION: 99 % | DIASTOLIC BLOOD PRESSURE: 66 MMHG | SYSTOLIC BLOOD PRESSURE: 106 MMHG

## 2020-12-26 DIAGNOSIS — Z98.89 OTHER SPECIFIED POSTPROCEDURAL STATES: Chronic | ICD-10-CM

## 2020-12-26 LAB
ALBUMIN SERPL ELPH-MCNC: 4.3 G/DL — SIGNIFICANT CHANGE UP (ref 3.3–5)
ALP SERPL-CCNC: 48 U/L — SIGNIFICANT CHANGE UP (ref 40–120)
ALT FLD-CCNC: 43 U/L — SIGNIFICANT CHANGE UP (ref 10–45)
ANION GAP SERPL CALC-SCNC: 11 MMOL/L — SIGNIFICANT CHANGE UP (ref 5–17)
APPEARANCE UR: ABNORMAL
AST SERPL-CCNC: 25 U/L — SIGNIFICANT CHANGE UP (ref 10–40)
BACTERIA # UR AUTO: NEGATIVE — SIGNIFICANT CHANGE UP
BASOPHILS # BLD AUTO: 0.03 K/UL — SIGNIFICANT CHANGE UP (ref 0–0.2)
BASOPHILS NFR BLD AUTO: 0.4 % — SIGNIFICANT CHANGE UP (ref 0–2)
BILIRUB SERPL-MCNC: 0.2 MG/DL — SIGNIFICANT CHANGE UP (ref 0.2–1.2)
BILIRUB UR-MCNC: NEGATIVE — SIGNIFICANT CHANGE UP
BUN SERPL-MCNC: 11 MG/DL — SIGNIFICANT CHANGE UP (ref 7–23)
CALCIUM SERPL-MCNC: 9.3 MG/DL — SIGNIFICANT CHANGE UP (ref 8.4–10.5)
CHLORIDE SERPL-SCNC: 103 MMOL/L — SIGNIFICANT CHANGE UP (ref 96–108)
CO2 SERPL-SCNC: 24 MMOL/L — SIGNIFICANT CHANGE UP (ref 22–31)
COLOR SPEC: ABNORMAL
CREAT SERPL-MCNC: 0.82 MG/DL — SIGNIFICANT CHANGE UP (ref 0.5–1.3)
DIFF PNL FLD: NEGATIVE — SIGNIFICANT CHANGE UP
EOSINOPHIL # BLD AUTO: 0.12 K/UL — SIGNIFICANT CHANGE UP (ref 0–0.5)
EOSINOPHIL NFR BLD AUTO: 1.8 % — SIGNIFICANT CHANGE UP (ref 0–6)
EPI CELLS # UR: 1 /HPF — SIGNIFICANT CHANGE UP
GLUCOSE SERPL-MCNC: 99 MG/DL — SIGNIFICANT CHANGE UP (ref 70–99)
GLUCOSE UR QL: NEGATIVE — SIGNIFICANT CHANGE UP
HCT VFR BLD CALC: 44.5 % — SIGNIFICANT CHANGE UP (ref 34.5–45)
HGB BLD-MCNC: 14.9 G/DL — SIGNIFICANT CHANGE UP (ref 11.5–15.5)
IMM GRANULOCYTES NFR BLD AUTO: 0.1 % — SIGNIFICANT CHANGE UP (ref 0–1.5)
KETONES UR-MCNC: NEGATIVE — SIGNIFICANT CHANGE UP
LEUKOCYTE ESTERASE UR-ACNC: NEGATIVE — SIGNIFICANT CHANGE UP
LYMPHOCYTES # BLD AUTO: 2.65 K/UL — SIGNIFICANT CHANGE UP (ref 1–3.3)
LYMPHOCYTES # BLD AUTO: 39.6 % — SIGNIFICANT CHANGE UP (ref 13–44)
MCHC RBC-ENTMCNC: 30 PG — SIGNIFICANT CHANGE UP (ref 27–34)
MCHC RBC-ENTMCNC: 33.5 GM/DL — SIGNIFICANT CHANGE UP (ref 32–36)
MCV RBC AUTO: 89.5 FL — SIGNIFICANT CHANGE UP (ref 80–100)
MONOCYTES # BLD AUTO: 0.36 K/UL — SIGNIFICANT CHANGE UP (ref 0–0.9)
MONOCYTES NFR BLD AUTO: 5.4 % — SIGNIFICANT CHANGE UP (ref 2–14)
NEUTROPHILS # BLD AUTO: 3.52 K/UL — SIGNIFICANT CHANGE UP (ref 1.8–7.4)
NEUTROPHILS NFR BLD AUTO: 52.7 % — SIGNIFICANT CHANGE UP (ref 43–77)
NITRITE UR-MCNC: NEGATIVE — SIGNIFICANT CHANGE UP
NRBC # BLD: 0 /100 WBCS — SIGNIFICANT CHANGE UP (ref 0–0)
PH UR: 8 — SIGNIFICANT CHANGE UP (ref 5–8)
PLATELET # BLD AUTO: 264 K/UL — SIGNIFICANT CHANGE UP (ref 150–400)
POTASSIUM SERPL-MCNC: 3.8 MMOL/L — SIGNIFICANT CHANGE UP (ref 3.5–5.3)
POTASSIUM SERPL-SCNC: 3.8 MMOL/L — SIGNIFICANT CHANGE UP (ref 3.5–5.3)
PROT SERPL-MCNC: 7.3 G/DL — SIGNIFICANT CHANGE UP (ref 6–8.3)
PROT UR-MCNC: SIGNIFICANT CHANGE UP
RBC # BLD: 4.97 M/UL — SIGNIFICANT CHANGE UP (ref 3.8–5.2)
RBC # FLD: 12.9 % — SIGNIFICANT CHANGE UP (ref 10.3–14.5)
RBC CASTS # UR COMP ASSIST: 1 /HPF — SIGNIFICANT CHANGE UP (ref 0–4)
SODIUM SERPL-SCNC: 138 MMOL/L — SIGNIFICANT CHANGE UP (ref 135–145)
SP GR SPEC: 1.02 — SIGNIFICANT CHANGE UP (ref 1.01–1.02)
UROBILINOGEN FLD QL: NEGATIVE — SIGNIFICANT CHANGE UP
WBC # BLD: 6.69 K/UL — SIGNIFICANT CHANGE UP (ref 3.8–10.5)
WBC # FLD AUTO: 6.69 K/UL — SIGNIFICANT CHANGE UP (ref 3.8–10.5)
WBC UR QL: 0 /HPF — SIGNIFICANT CHANGE UP (ref 0–5)

## 2020-12-26 PROCEDURE — 83690 ASSAY OF LIPASE: CPT

## 2020-12-26 PROCEDURE — 81001 URINALYSIS AUTO W/SCOPE: CPT

## 2020-12-26 PROCEDURE — 80053 COMPREHEN METABOLIC PANEL: CPT

## 2020-12-26 PROCEDURE — 96374 THER/PROPH/DIAG INJ IV PUSH: CPT

## 2020-12-26 PROCEDURE — 85025 COMPLETE CBC W/AUTO DIFF WBC: CPT

## 2020-12-26 PROCEDURE — 99284 EMERGENCY DEPT VISIT MOD MDM: CPT

## 2020-12-26 PROCEDURE — 99284 EMERGENCY DEPT VISIT MOD MDM: CPT | Mod: 25

## 2020-12-26 PROCEDURE — 87086 URINE CULTURE/COLONY COUNT: CPT

## 2020-12-26 RX ORDER — SUCRALFATE 1 G
1 TABLET ORAL ONCE
Refills: 0 | Status: COMPLETED | OUTPATIENT
Start: 2020-12-26 | End: 2020-12-26

## 2020-12-26 RX ORDER — KETOROLAC TROMETHAMINE 30 MG/ML
15 SYRINGE (ML) INJECTION ONCE
Refills: 0 | Status: COMPLETED | OUTPATIENT
Start: 2020-12-26 | End: 2020-12-26

## 2020-12-26 RX ORDER — SODIUM CHLORIDE 9 MG/ML
1000 INJECTION INTRAMUSCULAR; INTRAVENOUS; SUBCUTANEOUS ONCE
Refills: 0 | Status: COMPLETED | OUTPATIENT
Start: 2020-12-26 | End: 2020-12-26

## 2020-12-26 RX ORDER — ONDANSETRON 8 MG/1
4 TABLET, FILM COATED ORAL ONCE
Refills: 0 | Status: COMPLETED | OUTPATIENT
Start: 2020-12-26 | End: 2020-12-26

## 2020-12-26 RX ORDER — FAMOTIDINE 10 MG/ML
20 INJECTION INTRAVENOUS ONCE
Refills: 0 | Status: COMPLETED | OUTPATIENT
Start: 2020-12-26 | End: 2020-12-26

## 2020-12-26 RX ADMIN — SODIUM CHLORIDE 1000 MILLILITER(S): 9 INJECTION INTRAMUSCULAR; INTRAVENOUS; SUBCUTANEOUS at 02:35

## 2020-12-26 RX ADMIN — FAMOTIDINE 20 MILLIGRAM(S): 10 INJECTION INTRAVENOUS at 02:46

## 2020-12-26 RX ADMIN — Medication 1 GRAM(S): at 02:45

## 2020-12-26 NOTE — ED PROVIDER NOTE - PHYSICAL EXAMINATION
GENERAL: Awake, alert, NAD  HEENT: NC/AT, moist mucous membranes, PERRL, EOMI  LUNGS: CTAB, no wheezes or crackles   CARDIAC: RRR, no m/r/g  ABDOMEN: Soft, normal BS, non tender, non distended, no rebound, no guarding  BACK: No midline spinal tenderness, no CVA tenderness  EXT: No edema, no calf tenderness, 2+ DP pulses bilaterally, no deformities.  NEURO: A&Ox3. Moving all extremities.  SKIN: Warm and dry. No rash.  PSYCH: Normal affect.     PELVIC: Chaperoned by RN - No cervical motion or adnexal tenderness, no vaginal wall erythema, no abnormal discharge. GENERAL: Awake, alert, NAD  HEENT: NC/AT, moist mucous membranes, PERRL, EOMI  LUNGS: CTAB, no wheezes or crackles   CARDIAC: RRR, no m/r/g  ABDOMEN: Soft, normal BS, non tender, non distended, no rebound, no guarding  BACK: No midline spinal tenderness, no CVA tenderness  EXT: No edema, no calf tenderness, 2+ DP pulses bilaterally, no deformities.  NEURO: A&Ox3. Moving all extremities.  SKIN: Warm and dry. No rash.  PSYCH: Normal affect.     PELVIC: Chaperoned by PANDA Conn- No cervical motion or adnexal tenderness, no vaginal wall erythema, physiologic discharge. GENERAL: Awake, alert, NAD  HEENT: NC/AT, moist mucous membranes, PERRL, EOMI  LUNGS: CTAB, no wheezes or crackles   CARDIAC: RRR, no m/r/g  ABDOMEN: Soft, normal BS, non tender, non distended, no rebound, no guarding  BACK: No midline spinal tenderness, no CVA tenderness  EXT: No edema, no calf tenderness, 2+ DP pulses bilaterally, no deformities.  NEURO: A&Ox3. Moving all extremities.  SKIN: Warm and dry. No rash.  PSYCH: Normal affect.     PELVIC: Chaperoned by PANDA Daigle- No cervical motion or adnexal tenderness, no vaginal wall erythema, physiologic discharge.

## 2020-12-26 NOTE — ED ADULT TRIAGE NOTE - CHIEF COMPLAINT QUOTE
abdominal pain primarily in the epigastric area consistently since 1900. Nausea and diarrhea. Denies vomiting.

## 2020-12-26 NOTE — ED ADULT NURSE NOTE - OBJECTIVE STATEMENT
31 y/o female PMHx hernia, s/p cholecystectomy arrives to [facility] ED by uber from home with c/o abdominal pain. Pt states had gallbladder removed 3 years ago and has intermittent epigastric pain since. Patient ate at 1900 today and with constant 8/10 epigastric cramping pain post PO intake. Patient states also has right lower abdominal pain. +nausea and diarrhea, denies vomiting. Patient is A&Ox4. Respirations spontaneous and unlabored. SOB, dyspnea, cough, chest pain, palpitations. Abdomen soft, tenderness and patient states always bloated. LMP 2 weeks ago. Denies urinary symptoms. Denies fever/chills. No sick contacts. Skin is warm/dry and normal for race.

## 2020-12-26 NOTE — ED PROVIDER NOTE - PATIENT PORTAL LINK FT
You can access the FollowMyHealth Patient Portal offered by [facility] by registering at the following website: [email or web address]. By joining [facility]’s FollowMyHealth portal, you will also be able to view your health information using other applications (apps) compatible with our system.

## 2020-12-26 NOTE — ED PROVIDER NOTE - CARE PLAN
[facility] Cardiology Progress Note    Date of service: 3/5/2020    Subjective:  Ms Neel Cardenas has no cardiac c/o.; JURGEN    Objective:    Visit Vitals  /55 (BP 1 Location: Right arm, BP Patient Position: At rest)   Pulse 64   Temp 97.7 °F (36.5 °C)   Resp 14   Ht 5' 3\" (1.6 m)   Wt 63.9 kg (140 lb 14 oz)   SpO2 95%   BMI 24.95 kg/m²       Physical Exam   Constitutional: She is oriented to person, place, and time. She appears well-developed and well-nourished. HENT:   Head: Normocephalic and atraumatic. Eyes: Conjunctivae are normal. No scleral icterus. Neck: No JVD present. Cardiovascular: Normal rate and normal heart sounds. An irregularly irregular rhythm present. Exam reveals no gallop. No murmur heard. Pulmonary/Chest: Effort normal and breath sounds normal. No stridor. No respiratory distress. She has no wheezes. She has no rales. Abdominal: Soft. She exhibits no distension. Musculoskeletal:         General: Edema present. No deformity. Neurological: She is alert and oriented to person, place, and time. Skin: Skin is warm and dry. Psychiatric: She has a normal mood and affect. Her behavior is normal.       Data reviewed:  No results found for this or any previous visit (from the past 12 hour(s)). 05/27/19   ECHO ADULT COMPLETE 05/28/2019 5/28/2019    Narrative · Pericardium: There is a moderate left pleural effusion. · Tricuspid Valve: Mild to moderate tricuspid valve regurgitation is   present. · Right Ventricle: Moderately dilated right ventricle. Mildly reduced   systolic function. · Left Atrium: Moderately dilated left atrium. · Left Ventricle: Mildly to moderately increased wall thickness. Estimated   left ventricular ejection fraction is 56 - 60%. Abnormal left ventricular   septal motion. Systolic flattening of the interventricular septum   consistent with right ventricle pressure overload. Interventricular septal   \"bounce\". Inconclusive left ventricular diastolic function.   · Pulmonary Artery: Moderate to severe pulmonary hypertension. · Aortic Valve: Mild aortic valve regurgitation is present. Signed by: Citlalli Lazcano MD         Assessment:  1. Acute on chronic diastolic heart failure, NYHA class III  Stable  2.  Other persistent atrial fibrillation  Rate controlled    Plan:    Cont with current meds  Not able to restart spironolactone as of yet Principal Discharge DX:	Abdominal pain

## 2020-12-26 NOTE — ED PROVIDER NOTE - NSFOLLOWUPINSTRUCTIONS_ED_ALL_ED_FT
You were evaluated in the emergency department for abdominal pain. The results of your bloodwork are attached.    You should follow up with a gastroenterologist. A list has been provided to you, and someone should give you a call next week to help you schedule an appointment.     Please return to the emergency department with any new or worsening symptoms, including:   -Nausea and vomiting  -Inability to tolerate food or water  -High fevers  -Blood in the stool or vomit  -Pain not controlled with over the counter medications  -You pass out You were evaluated in the emergency department for abdominal pain. The results of your bloodwork are attached.    You should follow up with a gastroenterologist. A list has been provided to you, and someone should give you a call next week to help you schedule an appointment.     You can take Pepcid or Maalox (available over the counter at the pharmacy) for your discomfort. Please follow the instructions on the package.     Please return to the emergency department with any new or worsening symptoms, including:   -Nausea and vomiting  -Inability to tolerate food or water  -High fevers  -Blood in the stool or vomit  -Pain not controlled with over the counter medications  -You pass out

## 2020-12-26 NOTE — ED PROVIDER NOTE - PROGRESS NOTE DETAILS
Romi Bahena PGY-1: Nothing abnormal on labs. Will DC home with GI follow up. Romi Bahena PGY-1: Patient feels subjectively improved. Understands need for GI follow up for further evaluation as outpatient. Romi Bahena PGY-1:  Pt was re-evaluated at bedside, VSS, feeling better overall. Results were discussed with patient as well as return precautions and follow up plan with PCP and/or specialist. Time was taken to answer any questions that the patient had before providing them with discharge paperwork.

## 2020-12-26 NOTE — ED PROVIDER NOTE - OBJECTIVE STATEMENT
30 year old F with PSH Cholecystectomy 3years ago, Hernia Repair as child presenting with epigastric abdominal pain. Patient notes she's had chronic epigastric pain since her cholecystectomy. It is usually sharp and intermittent. However tonight, beginning around 7pm she began experiencing constant, sharp epigastric pain radiating down to her lower abdomen. Associated with nausea, but no vomiting. LMP two weeks ago. 30 year old F with PSH Cholecystectomy 3years ago, Hernia Repair as child presenting with epigastric abdominal pain. Patient notes she's had chronic epigastric pain since her cholecystectomy. It is usually sharp and intermittent. However tonight, beginning around 7pm she began experiencing constant, sharp epigastric pain radiating down to her lower abdomen. Associated with nausea, but no vomiting. Notes chronic diarrhea. LMP two weeks ago. No vaginal discharge or spotting. Denies hematuria, dysuria, fevers, CP, SOB, cough. Was told to follow up with GI, but has not been able to yet.

## 2020-12-26 NOTE — ED ADULT NURSE NOTE - NSIMPLEMENTINTERV_GEN_ALL_ED
Implemented All Universal Safety Interventions:  [place] to call system. Call bell, personal items and telephone within reach. Instruct patient to call for assistance. Room bathroom lighting operational. Non-slip footwear when patient is off stretcher. Physically safe environment: no spills, clutter or unnecessary equipment. Stretcher in lowest position, wheels locked, appropriate side rails in place.

## 2020-12-26 NOTE — ED PROVIDER NOTE - ATTENDING CONTRIBUTION TO CARE
Attending Statement (GIGI Schroeder MD):    HPI: 29 y/o F with h/o prior cholecystectomy (~3y/ago) presenting with upper abdominal pain (indicates epigastrium) which radiates to the LUQ / left abdomen beginning around 7pm, shortly after eating.  States sharp, constant; has had similar symptoms a/w eating for months, but usually resolved on its own.  Is not on any medications did not take any medications for current symptoms.  Pt reports this is the same pain for which she was evaluated in this ED in August of this year (reviewed EMR: notes CT scan with ? mild colitis vs underdistension but no acute lab abnormalities); states she didn't wait in ED for results as it was taking to long and has not followed up with a gastroenterologist (as per recommendations noted in her d/c instructions in EMR).  No fever/chills, no nausea/vomiting, +diarrhea. No rash.  Denies black or bloody stools.  LMP 2 weeks ago, sexually active w/ intermittent condom use, denies vaginal discharge. Denies urinary symptoms.    Review of Systems:  -General: no fever or chills  -ENT: no congestion, no difficulty swallowing  -Pulmonary: no cough, no shortness of breath  -Cardiac: no chest pain, no palpitations  -Gastrointestinal: + abdominal pain, no nausea, no vomiting, and + diarrhea.  -Genitourinary: no blood or pain with urination  -Musculoskeletal: no back or neck pain  -Skin: no rashes  -Endocrine: No h/o diabetes or thyroid disease  -Neurologic: No focal weakness or numbness    All else negative unless otherwise specified elsewhere in this note.    PSH/PMH as noted above    On Physical Exam:  General: well appearing, in NAD, speaking clearly in full sentences and without difficulty; cooperative with exam  HEENT: PERRL, MMM  Neck: no neck tenderness, no nuchal rigidity  Cardiac: normal s1, s2; RRR; no MGR  Lungs: CTABL  Abdomen: soft nontender/nondistended  : no bladder tenderness or distension  Skin: intact, no rash  Extremities: no peripheral edema, no gross deformities  Neuro: no gross neurologic deficits    MDM: 29y/o F p/w epigastric pain; nontender on exam; residents gynecologic exam with no abnormalities noted; most likely this represents gastritis/pud; will exclude pancreatitis and pregnancy; abdomen is nontender and had CT scan in August of this year for same symptoms; RUQ US not indicated unless LFTs abnormal given prior cholecystectomy 3 years ago (retained stone highly unlikely). 30F with no medical comorbidities, not concerning for ACS, PE or aortic dissection based on this presentation.  No fever or other signs /symptoms fo acute infectious etiology (chronic unchanged diarrhea described as loose stools, much more likely chronic gi issue than acute infectious).  Plan to obtain labs: cbc (to evaluate for leukocytosis or anemia), CMP (to evaluate for electrolyte abnormalities or renal/liver dysfunction), lipase (to evaluate for pancreatitis) and ucg/hcg (exclude pregnancy).  Trial pepcid and carafate for symptom control.  If improving, can be discharged with outpatient GI f/u.

## 2020-12-26 NOTE — ED PROVIDER NOTE - CLINICAL SUMMARY MEDICAL DECISION MAKING FREE TEXT BOX
30 year old F s/p cholecystectomy 3 yeas ago p/w epigastric abdominal pain. Recent CT negative for acute pathology, suggested inflammation. Patient never followed up with GI. Well appearing, afebrile, VSS. Non peritoneal abdomen. Most likely gastritis. Will trial sucralfate, pepcid for symptom control, fluids, basic labs. No need for repeat imaging at this time. Reassess.

## 2020-12-27 LAB
CULTURE RESULTS: SIGNIFICANT CHANGE UP
SPECIMEN SOURCE: SIGNIFICANT CHANGE UP

## 2021-03-05 NOTE — ED PROVIDER NOTE - CLINICAL SUMMARY MEDICAL DECISION MAKING FREE TEXT BOX
Infusion Nursing Note:  Niesha Adhikari presents today for eTobb.    Patient seen by provider today: No   present during visit today: Not Applicable.    Note: Patient has been feeling some weeks and has fallen x 2 in last few months. Working on letting primary to address.  Patient did  meet criteria for an asymptomatic covid-19 PCR test in infusion today. Patient declined the covid-19 test.    Intravenous Access:  Peripheral IV placed.    Treatment Conditions:  Lab Results   Component Value Date     02/16/2021                   Lab Results   Component Value Date    POTASSIUM 4.7 02/16/2021           Lab Results   Component Value Date    MAG 1.7 12/05/2006            Lab Results   Component Value Date    CR 0.70 02/16/2021                   Lab Results   Component Value Date    ELIZABETH 10.0 02/16/2021                Lab Results   Component Value Date    BILITOTAL 0.5 01/06/2021           Lab Results   Component Value Date    ALBUMIN 4.1 01/06/2021                    Lab Results   Component Value Date    ALT 22 01/06/2021           Lab Results   Component Value Date    AST 22 01/06/2021       Results reviewed, labs MET treatment parameters, ok to proceed with treatment.      Post Infusion Assessment:  Patient tolerated injection without incident.  Blood return noted pre and post infusion.  Site patent and intact, free from redness, edema or discomfort.  No evidence of extravasations.  Access discontinued per protocol.       Discharge Plan:   Discharge instructions reviewed with: Patient.  Patient and/or family verbalized understanding of discharge instructions and all questions answered.  Patient discharged in stable condition accompanied by: self.  Departure Mode: Ambulatory.    Daiana Burton RN                      
See attending attestation and progress notes for course.

## 2021-06-09 ENCOUNTER — LABORATORY RESULT (OUTPATIENT)
Age: 31
End: 2021-06-09

## 2021-06-09 ENCOUNTER — OUTPATIENT (OUTPATIENT)
Dept: OUTPATIENT SERVICES | Facility: HOSPITAL | Age: 31
LOS: 1 days | End: 2021-06-09
Payer: MEDICAID

## 2021-06-09 ENCOUNTER — APPOINTMENT (OUTPATIENT)
Dept: OBGYN | Facility: CLINIC | Age: 31
End: 2021-06-09
Payer: MEDICAID

## 2021-06-09 ENCOUNTER — RESULT CHARGE (OUTPATIENT)
Age: 31
End: 2021-06-09

## 2021-06-09 VITALS — WEIGHT: 186 LBS | SYSTOLIC BLOOD PRESSURE: 128 MMHG | DIASTOLIC BLOOD PRESSURE: 80 MMHG

## 2021-06-09 DIAGNOSIS — N76.0 ACUTE VAGINITIS: ICD-10-CM

## 2021-06-09 DIAGNOSIS — Z98.89 OTHER SPECIFIED POSTPROCEDURAL STATES: Chronic | ICD-10-CM

## 2021-06-09 DIAGNOSIS — O36.80X0 PREGNANCY WITH INCONCLUSIVE FETAL VIABILITY, NOT APPLICABLE OR UNSPECIFIED: ICD-10-CM

## 2021-06-09 LAB — HCG UR QL: POSITIVE

## 2021-06-09 PROCEDURE — 99202 OFFICE O/P NEW SF 15 MIN: CPT | Mod: GE

## 2021-06-09 PROCEDURE — G0463: CPT

## 2021-06-09 PROCEDURE — 84702 CHORIONIC GONADOTROPIN TEST: CPT

## 2021-06-10 LAB — HCG SERPL-ACNC: 203 MIU/ML — HIGH

## 2021-06-10 NOTE — PROCEDURE
[Transvaginal OB Sonogram] : Transvaginal OB Sonogram [Intrauterine Pregnancy] : no intrauterine pregnancy [Yolk Sac] : no yolk sac [FreeTextEntry1] : Endometrium: 1.4mm \par Possible small/early gestational sac measuring .40x.38x.39cm. \par No yolk sac. \par No adnexal mass

## 2021-06-10 NOTE — DISCUSSION/SUMMARY
[FreeTextEntry1] : 32y/o  presenting for confirmation of pregnancy at approximately 5w1d. Possible early gestational sac identified on TVUS, however no yolk sac visible. Patient with pregnancy of unknown location at this time, differential includes early intrauterine pregnancy, failed intrauterine pregnancy or possible ectopic pregnancy. \par  - Differential dx and importance of close f/u discussed with patient, verbalized understanding \par  - serum bHCG to be obtained today \par  - Patient to have repeat bHCG drawn  \par  - Patient to f/u in clinic Monday for repeat labs/imaging \par  - Patient will require repeat Pap upon follow-up (s/p Colpo 2019, LSIL CIN1) \par \par d/w Dr. Baird \par Marta PGY2

## 2021-06-10 NOTE — HISTORY OF PRESENT ILLNESS
[FreeTextEntry1] : 32y/o  LMP 5/ presenting after positive home pregnancy test for confirmation of pregnancy. Patient reports 1w of pelvic cramping. She also notes pink bleeding noticed while urinating yesterday. She has not required any pad use and denies bleeding today. She notes several weeks of nausea. She is otherwise without complaints. She is unsure if she will keep or desire termination of the pregnancy. \par \par She denies lightheadedness, dizziness, severe abdominal pain, heavy vaginal bleeding or shortness of breath.

## 2021-06-11 ENCOUNTER — LABORATORY RESULT (OUTPATIENT)
Age: 31
End: 2021-06-11

## 2021-06-14 ENCOUNTER — RESULT CHARGE (OUTPATIENT)
Age: 31
End: 2021-06-14

## 2021-06-14 ENCOUNTER — LABORATORY RESULT (OUTPATIENT)
Age: 31
End: 2021-06-14

## 2021-06-14 ENCOUNTER — NON-APPOINTMENT (OUTPATIENT)
Age: 31
End: 2021-06-14

## 2021-06-14 ENCOUNTER — APPOINTMENT (OUTPATIENT)
Dept: OBGYN | Facility: CLINIC | Age: 31
End: 2021-06-14
Payer: MEDICAID

## 2021-06-14 ENCOUNTER — OUTPATIENT (OUTPATIENT)
Dept: OUTPATIENT SERVICES | Facility: HOSPITAL | Age: 31
LOS: 1 days | End: 2021-06-14
Payer: MEDICAID

## 2021-06-14 VITALS — SYSTOLIC BLOOD PRESSURE: 120 MMHG | DIASTOLIC BLOOD PRESSURE: 70 MMHG

## 2021-06-14 VITALS — SYSTOLIC BLOOD PRESSURE: 120 MMHG | WEIGHT: 185.25 LBS | DIASTOLIC BLOOD PRESSURE: 80 MMHG

## 2021-06-14 VITALS — TEMPERATURE: 97.5 F

## 2021-06-14 DIAGNOSIS — O36.80X0 PREGNANCY WITH INCONCLUSIVE FETAL VIABILITY, NOT APPLICABLE OR UNSPECIFIED: ICD-10-CM

## 2021-06-14 DIAGNOSIS — N76.0 ACUTE VAGINITIS: ICD-10-CM

## 2021-06-14 DIAGNOSIS — Z01.419 ENCOUNTER FOR GYNECOLOGICAL EXAMINATION (GENERAL) (ROUTINE) W/OUT ABNORMAL FINDINGS: ICD-10-CM

## 2021-06-14 DIAGNOSIS — O03.9 COMPLETE OR UNSPECIFIED SPONTANEOUS ABORTION WITHOUT COMPLICATION: ICD-10-CM

## 2021-06-14 DIAGNOSIS — Z98.89 OTHER SPECIFIED POSTPROCEDURAL STATES: Chronic | ICD-10-CM

## 2021-06-14 DIAGNOSIS — Z98.890 OTHER SPECIFIED POSTPROCEDURAL STATES: ICD-10-CM

## 2021-06-14 DIAGNOSIS — Z78.9 OTHER SPECIFIED HEALTH STATUS: ICD-10-CM

## 2021-06-14 PROCEDURE — 87624 HPV HI-RISK TYP POOLED RSLT: CPT

## 2021-06-14 PROCEDURE — G0463: CPT | Mod: 25

## 2021-06-14 PROCEDURE — 86900 BLOOD TYPING SEROLOGIC ABO: CPT

## 2021-06-14 PROCEDURE — 87491 CHLMYD TRACH DNA AMP PROBE: CPT

## 2021-06-14 PROCEDURE — 88175 CYTOPATH C/V AUTO FLUID REDO: CPT

## 2021-06-14 PROCEDURE — 76815 OB US LIMITED FETUS(S): CPT

## 2021-06-14 PROCEDURE — 84702 CHORIONIC GONADOTROPIN TEST: CPT

## 2021-06-14 PROCEDURE — 88141 CYTOPATH C/V INTERPRET: CPT

## 2021-06-14 PROCEDURE — 99215 OFFICE O/P EST HI 40 MIN: CPT | Mod: GC

## 2021-06-14 PROCEDURE — 86901 BLOOD TYPING SEROLOGIC RH(D): CPT

## 2021-06-14 PROCEDURE — 87591 N.GONORRHOEAE DNA AMP PROB: CPT

## 2021-06-14 RX ORDER — OXYCODONE 5 MG/1
5 TABLET ORAL EVERY 4 HOURS
Qty: 2 | Refills: 0 | Status: ACTIVE | COMMUNITY
Start: 2021-06-14 | End: 1900-01-01

## 2021-06-15 LAB
C TRACH RRNA SPEC QL NAA+PROBE: SIGNIFICANT CHANGE UP
HCG SERPL-ACNC: 351 MIU/ML — HIGH
HCG UR QL: POSITIVE
HPV HIGH+LOW RISK DNA PNL CVX: SIGNIFICANT CHANGE UP
N GONORRHOEA RRNA SPEC QL NAA+PROBE: SIGNIFICANT CHANGE UP
QUALITY CONTROL: YES
SPECIMEN SOURCE: SIGNIFICANT CHANGE UP

## 2021-06-17 ENCOUNTER — NON-APPOINTMENT (OUTPATIENT)
Age: 31
End: 2021-06-17

## 2021-06-17 ENCOUNTER — EMERGENCY (EMERGENCY)
Facility: HOSPITAL | Age: 31
LOS: 1 days | Discharge: HOME | End: 2021-06-17
Attending: EMERGENCY MEDICINE
Payer: MEDICAID

## 2021-06-17 VITALS
HEIGHT: 63 IN | HEART RATE: 87 BPM | WEIGHT: 179.9 LBS | SYSTOLIC BLOOD PRESSURE: 118 MMHG | TEMPERATURE: 99 F | OXYGEN SATURATION: 97 % | RESPIRATION RATE: 16 BRPM | DIASTOLIC BLOOD PRESSURE: 84 MMHG

## 2021-06-17 DIAGNOSIS — Z98.89 OTHER SPECIFIED POSTPROCEDURAL STATES: Chronic | ICD-10-CM

## 2021-06-17 LAB
ALBUMIN SERPL ELPH-MCNC: 4.3 G/DL — SIGNIFICANT CHANGE UP (ref 3.3–5)
ALP SERPL-CCNC: 40 U/L — SIGNIFICANT CHANGE UP (ref 40–120)
ALT FLD-CCNC: 61 U/L — HIGH (ref 10–45)
ANION GAP SERPL CALC-SCNC: 13 MMOL/L — SIGNIFICANT CHANGE UP (ref 5–17)
AST SERPL-CCNC: 42 U/L — HIGH (ref 10–40)
BILIRUB SERPL-MCNC: 0.3 MG/DL — SIGNIFICANT CHANGE UP (ref 0.2–1.2)
BLD GP AB SCN SERPL QL: NEGATIVE — SIGNIFICANT CHANGE UP
BUN SERPL-MCNC: 9 MG/DL — SIGNIFICANT CHANGE UP (ref 7–23)
CALCIUM SERPL-MCNC: 9.2 MG/DL — SIGNIFICANT CHANGE UP (ref 8.4–10.5)
CHLORIDE SERPL-SCNC: 103 MMOL/L — SIGNIFICANT CHANGE UP (ref 96–108)
CO2 SERPL-SCNC: 21 MMOL/L — LOW (ref 22–31)
CREAT SERPL-MCNC: 0.65 MG/DL — SIGNIFICANT CHANGE UP (ref 0.5–1.3)
GLUCOSE SERPL-MCNC: 108 MG/DL — HIGH (ref 70–99)
HCG SERPL-ACNC: 390.3 MIU/ML — HIGH
HCG SERPL-MCNC: 413 MIU/ML
HCT VFR BLD CALC: 41 % — SIGNIFICANT CHANGE UP (ref 34.5–45)
HGB BLD-MCNC: 13.7 G/DL — SIGNIFICANT CHANGE UP (ref 11.5–15.5)
MCHC RBC-ENTMCNC: 29.3 PG — SIGNIFICANT CHANGE UP (ref 27–34)
MCHC RBC-ENTMCNC: 33.4 GM/DL — SIGNIFICANT CHANGE UP (ref 32–36)
MCV RBC AUTO: 87.8 FL — SIGNIFICANT CHANGE UP (ref 80–100)
NRBC # BLD: 0 /100 WBCS — SIGNIFICANT CHANGE UP (ref 0–0)
PLATELET # BLD AUTO: 249 K/UL — SIGNIFICANT CHANGE UP (ref 150–400)
POTASSIUM SERPL-MCNC: 3.8 MMOL/L — SIGNIFICANT CHANGE UP (ref 3.5–5.3)
POTASSIUM SERPL-SCNC: 3.8 MMOL/L — SIGNIFICANT CHANGE UP (ref 3.5–5.3)
PROT SERPL-MCNC: 7.4 G/DL — SIGNIFICANT CHANGE UP (ref 6–8.3)
RBC # BLD: 4.67 M/UL — SIGNIFICANT CHANGE UP (ref 3.8–5.2)
RBC # FLD: 12.3 % — SIGNIFICANT CHANGE UP (ref 10.3–14.5)
RH IG SCN BLD-IMP: POSITIVE — SIGNIFICANT CHANGE UP
SODIUM SERPL-SCNC: 137 MMOL/L — SIGNIFICANT CHANGE UP (ref 135–145)
WBC # BLD: 6.19 K/UL — SIGNIFICANT CHANGE UP (ref 3.8–10.5)
WBC # FLD AUTO: 6.19 K/UL — SIGNIFICANT CHANGE UP (ref 3.8–10.5)

## 2021-06-17 PROCEDURE — 76817 TRANSVAGINAL US OBSTETRIC: CPT | Mod: 26

## 2021-06-17 PROCEDURE — 99285 EMERGENCY DEPT VISIT HI MDM: CPT

## 2021-06-17 RX ORDER — SODIUM CHLORIDE 9 MG/ML
1000 INJECTION, SOLUTION INTRAVENOUS ONCE
Refills: 0 | Status: COMPLETED | OUTPATIENT
Start: 2021-06-17 | End: 2021-06-17

## 2021-06-17 RX ORDER — ACETAMINOPHEN 500 MG
975 TABLET ORAL ONCE
Refills: 0 | Status: COMPLETED | OUTPATIENT
Start: 2021-06-17 | End: 2021-06-17

## 2021-06-17 RX ADMIN — Medication 975 MILLIGRAM(S): at 19:24

## 2021-06-17 RX ADMIN — SODIUM CHLORIDE 166.67 MILLILITER(S): 9 INJECTION, SOLUTION INTRAVENOUS at 21:24

## 2021-06-17 NOTE — CONSULT NOTE ADULT - ASSESSMENT
30 y/o  LMP / p/w R ectopic pregnancy measuring 1.9x1.4x1.5 cm with bHCG of 390.     -Would recommend methotrexate therapy for treatment of ectopic pregnancy.    -Patient counseled on methotrexate therapy as treatment for ectopic pregnancy.  Common side effects of the medication as well as restrictions while on the medication were reviewed with patient and patient signed methotrexate information sheet that was placed in her chart.    - Pt informed not to have sexual intercourse, take NSAIDs/Aspirin, or drink alcohol while on this medication.  -Reviewed with patient the 15-20% methotrexate failure rate and possibility of necessity for additional dose of methotrexate.   -Consents for methotrexate signed with patient with attending at bedside.    -Chemotherapy drug order form sent to pharmacy with methotrexate dose calculated based on BSA for patient.   -Patient given strict precautions to call her physician or return to ED if she experiences any severe abdominal pain, dizziness, lightheadedness, severe n/v, or any heavy vaginal bleeding >2 pads/hour for >2 hours.    -Patient instructed on need for follow up of b-hcg on day 4 and day 7 of methotrexate therapy.  Patient intends to follow up in the ED or GYN office on  () and ()  -Once patient receives methotrexate therapy she is cleared for discharge from OBGYN perspective.  Primary management per ED team.   -Given patient's mild transaminitis recommend CMP trend and f/u with PCP    Patient seen and evaluated with Dr. Ely Mims  PGY-1

## 2021-06-17 NOTE — ED PROVIDER NOTE - NS ED ROS FT
Constitutional: (-) fever (-) vomiting  Eyes/ENT: (-) vision changes  Cardiovascular: (-) chest pain, (-) wheezing  Respiratory: (-) cough, (-) shortness of breath  Gastrointestinal: (-) vomiting, (-) diarrhea, (+) abdominal pain  : (-) dysuria, +vaginal bleeding  Musculoskeletal: (-) back pain  Integumentary: (-) rash, (-) edema  Neurological: (-)loc  Allergic/Immunologic: (-) pruritus  Endocrine: No history of thyroid disease

## 2021-06-17 NOTE — ED PROVIDER NOTE - PHYSICAL EXAMINATION
Vitals: I have reviewed the patients vital signs.   General: well appearing, well nourished, no acute distress  HEENT: atraumatic, normocephalic, airway patent, EOMI and appropriate tracking  Neck: no JVD, no tracheal deviation  Chest/Lungs: no trauma, symmetric chest rise, lung sounds clear bilaterally, speaking in complete sentences  Heart: Regular rate, regular rhythm, skin well perfused  Abdomen: Mild lower abdominal tenderness, no rebound or guarding  Neuro: A+Ox3, CNII-XII intact, ambulating without difficulty, non dysarthric speech  Eyes: PERRL, EOMI, no conjunctival injection  MSK: all limbs at baseline strength, no wasting or atrophy,   Skin: no cyanosis, no jaundice, no new emergent lesions     : chaperoned by PANDA Claudio, scant blood in vault, mild cervical tenderness, L>R adnexal tenderness.

## 2021-06-17 NOTE — ED PROVIDER NOTE - ATTENDING CONTRIBUTION TO CARE
------------ATTENDING NOTE------------  pt c/o having light uterine bleeding, crampy mild pelvic pain, complicated as pregnancy and abnormal HCG trending and describes D&C for this, HD stable, awaiting labs/imaging and close reassessments -->  - Rafael Eddy MD   ----------------------------------------------- ------------ATTENDING NOTE------------  pt c/o having light uterine bleeding, crampy mild pelvic pain, complicated as pregnancy and abnormal HCG trending and describes D&C for this, HD stable, awaiting labs/imaging and close reassessments --> (21:00) pt HD stable, US w/ L ectopic, awaiting OBGYN consult for likely admission -->  - Rafael Eddy MD   ----------------------------------------------- ------------ATTENDING NOTE------------  pt c/o having light uterine bleeding, crampy mild pelvic pain, complicated as pregnancy and abnormal HCG trending and describes D&C for this, HD stable, awaiting labs/imaging and close reassessments --> (21:00) pt HD stable, US w/ L ectopic, awaiting OBGYN consult for likely admission --> (00:30) remaining stable, OBGYN at bedside, will be ordering methotrexate and pending reassessments at ED Sign Out.  - Raafel Eddy MD   -----------------------------------------------

## 2021-06-17 NOTE — ED PROVIDER NOTE - NSFOLLOWUPINSTRUCTIONS_ED_ALL_ED_FT
See OBGYN in next week as directed and return for continued testing as instructed.    Follow Methotrexate information and return instructions given to you.    See ECTOPIC PREGNANCY information and return instructions given to you.    Seek immediate medical care for new/worsening symptoms/concerns.

## 2021-06-17 NOTE — ED CLERICAL - NS ED CLERK NOTE PRE-ARRIVAL INFORMATION; ADDITIONAL PRE-ARRIVAL INFORMATION
CC/Reason For referral: Methatritate Pregnancy  Preferred Consultant(if applicable): OBGYN  Who admits for you (if needed): N/A  Do you have documents you would like to fax over? No  Would you still like to speak to an ED attending? No

## 2021-06-17 NOTE — ED PROVIDER NOTE - OBJECTIVE STATEMENT
32 y/o F no PMH, , s/p D+C this last monday for a PUL, here with vaginal bleeding, rise in outpatient HCG. 30 y/o F no PMH, , s/p D+C this last monday for a PUL, here with vaginal bleeding, rise in outpatient HCG. Sent in from clinic. Pain in low abdomen, crampy, 1-2 pads on bleeding. Mild nausea, no vomiting. Afebrile, no urinary complaints, no constipation or diarrhea.
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
## 2021-06-17 NOTE — ED PROVIDER NOTE - CARE PROVIDER_API CALL
Eva Graves (MD)  Obstetrics and Gynecology  [address]  [address]  [place] [id number]  Phone: [phone]  Fax: [phone]  Follow Up Time:

## 2021-06-17 NOTE — ED PROVIDER NOTE - PATIENT PORTAL LINK FT
You can access the FollowMyHealth Patient Portal offered by [facility] by registering at the following website: [email or web address]. By joining [facility]’s FollowMyHealth portal, you will also be able to view your health information using other applications (apps) compatible with our system.
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
## 2021-06-17 NOTE — ED PROVIDER NOTE - CLINICAL SUMMARY MEDICAL DECISION MAKING FREE TEXT BOX
see MD Note see MD Note      Thierry: pt brought in for pregnancy of unknown location with rise in HCG. Had previous D+C this week, with HCG continuing to rise, now with low abd pain and vaginal bleeding. HD stable.  exam with L>R adnexal tenderness, minimal active bleeding. Well appearing. Will get basics, t/s, tvus, OB c/s

## 2021-06-17 NOTE — ED PROVIDER NOTE - PROGRESS NOTE DETAILS
Thierry: OB consulted will see patient. Thierry: called by radiology, R ectopic, remains hemodynamically stable, OB to see, will put on monitor, maintenance fluids. Amada Dyson, resident MD: pt was given dose of methotrexate and is aware to f/u with OB/gyn in 4 days. will discharge patient home at this time. printed out copies of results for patient to take home. discussed return precautions and need for outpatient follow up.
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
## 2021-06-17 NOTE — ED ADULT NURSE REASSESSMENT NOTE - NS ED NURSE REASSESS COMMENT FT1
Report received from PANDA Palencia. Upon assessment, pt is AO x4, speaking in full and complete sentences. Pt c/o suprapubic pain, 975 mg of tylenol to be given. Pt updated on plan of care, fall and safety precautions in place, call bell within reach.

## 2021-06-17 NOTE — ED PROVIDER NOTE - CARE PLAN
Principal Discharge DX:	Pregnancy of unknown anatomic location   Principal Discharge DX:	Ectopic pregnancy of right ovary

## 2021-06-18 VITALS
OXYGEN SATURATION: 100 % | SYSTOLIC BLOOD PRESSURE: 100 MMHG | TEMPERATURE: 98 F | DIASTOLIC BLOOD PRESSURE: 66 MMHG | RESPIRATION RATE: 18 BRPM | HEART RATE: 74 BPM

## 2021-06-18 PROCEDURE — 80053 COMPREHEN METABOLIC PANEL: CPT

## 2021-06-18 PROCEDURE — 99284 EMERGENCY DEPT VISIT MOD MDM: CPT | Mod: 25

## 2021-06-18 PROCEDURE — 86901 BLOOD TYPING SEROLOGIC RH(D): CPT

## 2021-06-18 PROCEDURE — 84702 CHORIONIC GONADOTROPIN TEST: CPT

## 2021-06-18 PROCEDURE — 86900 BLOOD TYPING SEROLOGIC ABO: CPT

## 2021-06-18 PROCEDURE — 85027 COMPLETE CBC AUTOMATED: CPT

## 2021-06-18 PROCEDURE — 76817 TRANSVAGINAL US OBSTETRIC: CPT

## 2021-06-18 PROCEDURE — 86850 RBC ANTIBODY SCREEN: CPT

## 2021-06-18 RX ORDER — METHOTREXATE 2.5 MG/1
100 TABLET ORAL ONCE
Refills: 0 | Status: COMPLETED | OUTPATIENT
Start: 2021-06-18 | End: 2021-06-18

## 2021-06-18 RX ADMIN — METHOTREXATE 100 MILLIGRAM(S): 2.5 TABLET ORAL at 01:23

## 2021-06-21 ENCOUNTER — NON-APPOINTMENT (OUTPATIENT)
Age: 31
End: 2021-06-21

## 2021-06-24 ENCOUNTER — APPOINTMENT (OUTPATIENT)
Dept: OBGYN | Facility: CLINIC | Age: 31
End: 2021-06-24

## 2021-06-25 ENCOUNTER — EMERGENCY (EMERGENCY)
Facility: HOSPITAL | Age: 31
LOS: 1 days | Discharge: HOME | End: 2021-06-25
Attending: STUDENT IN AN ORGANIZED HEALTH CARE EDUCATION/TRAINING PROGRAM
Payer: MEDICAID

## 2021-06-25 VITALS
HEART RATE: 70 BPM | SYSTOLIC BLOOD PRESSURE: 110 MMHG | TEMPERATURE: 99 F | RESPIRATION RATE: 18 BRPM | DIASTOLIC BLOOD PRESSURE: 70 MMHG | OXYGEN SATURATION: 98 %

## 2021-06-25 VITALS
RESPIRATION RATE: 18 BRPM | DIASTOLIC BLOOD PRESSURE: 81 MMHG | WEIGHT: 182.98 LBS | HEIGHT: 63 IN | SYSTOLIC BLOOD PRESSURE: 110 MMHG | TEMPERATURE: 98 F | OXYGEN SATURATION: 100 % | HEART RATE: 84 BPM

## 2021-06-25 DIAGNOSIS — Z98.89 OTHER SPECIFIED POSTPROCEDURAL STATES: Chronic | ICD-10-CM

## 2021-06-25 LAB
ALBUMIN SERPL ELPH-MCNC: 4.2 G/DL — SIGNIFICANT CHANGE UP (ref 3.3–5)
ALP SERPL-CCNC: 35 U/L — LOW (ref 40–120)
ALT FLD-CCNC: 83 U/L — HIGH (ref 10–45)
ANION GAP SERPL CALC-SCNC: 13 MMOL/L — SIGNIFICANT CHANGE UP (ref 5–17)
AST SERPL-CCNC: 33 U/L — SIGNIFICANT CHANGE UP (ref 10–40)
BASOPHILS # BLD AUTO: 0.01 K/UL — SIGNIFICANT CHANGE UP (ref 0–0.2)
BASOPHILS NFR BLD AUTO: 0.2 % — SIGNIFICANT CHANGE UP (ref 0–2)
BILIRUB SERPL-MCNC: 0.4 MG/DL — SIGNIFICANT CHANGE UP (ref 0.2–1.2)
BUN SERPL-MCNC: 10 MG/DL — SIGNIFICANT CHANGE UP (ref 7–23)
CALCIUM SERPL-MCNC: 9.3 MG/DL — SIGNIFICANT CHANGE UP (ref 8.4–10.5)
CHLORIDE SERPL-SCNC: 101 MMOL/L — SIGNIFICANT CHANGE UP (ref 96–108)
CO2 SERPL-SCNC: 22 MMOL/L — SIGNIFICANT CHANGE UP (ref 22–31)
CREAT SERPL-MCNC: 0.61 MG/DL — SIGNIFICANT CHANGE UP (ref 0.5–1.3)
EOSINOPHIL # BLD AUTO: 0.06 K/UL — SIGNIFICANT CHANGE UP (ref 0–0.5)
EOSINOPHIL NFR BLD AUTO: 1.1 % — SIGNIFICANT CHANGE UP (ref 0–6)
GLUCOSE SERPL-MCNC: 99 MG/DL — SIGNIFICANT CHANGE UP (ref 70–99)
HCG SERPL-ACNC: 744.1 MIU/ML — HIGH
HCT VFR BLD CALC: 38.7 % — SIGNIFICANT CHANGE UP (ref 34.5–45)
HGB BLD-MCNC: 13.1 G/DL — SIGNIFICANT CHANGE UP (ref 11.5–15.5)
IMM GRANULOCYTES NFR BLD AUTO: 0.4 % — SIGNIFICANT CHANGE UP (ref 0–1.5)
LYMPHOCYTES # BLD AUTO: 2.04 K/UL — SIGNIFICANT CHANGE UP (ref 1–3.3)
LYMPHOCYTES # BLD AUTO: 36.6 % — SIGNIFICANT CHANGE UP (ref 13–44)
MCHC RBC-ENTMCNC: 29.4 PG — SIGNIFICANT CHANGE UP (ref 27–34)
MCHC RBC-ENTMCNC: 33.9 GM/DL — SIGNIFICANT CHANGE UP (ref 32–36)
MCV RBC AUTO: 87 FL — SIGNIFICANT CHANGE UP (ref 80–100)
MONOCYTES # BLD AUTO: 0.33 K/UL — SIGNIFICANT CHANGE UP (ref 0–0.9)
MONOCYTES NFR BLD AUTO: 5.9 % — SIGNIFICANT CHANGE UP (ref 2–14)
NEUTROPHILS # BLD AUTO: 3.12 K/UL — SIGNIFICANT CHANGE UP (ref 1.8–7.4)
NEUTROPHILS NFR BLD AUTO: 55.8 % — SIGNIFICANT CHANGE UP (ref 43–77)
NRBC # BLD: 0 /100 WBCS — SIGNIFICANT CHANGE UP (ref 0–0)
PLATELET # BLD AUTO: 221 K/UL — SIGNIFICANT CHANGE UP (ref 150–400)
POTASSIUM SERPL-MCNC: 3.5 MMOL/L — SIGNIFICANT CHANGE UP (ref 3.5–5.3)
POTASSIUM SERPL-SCNC: 3.5 MMOL/L — SIGNIFICANT CHANGE UP (ref 3.5–5.3)
PROT SERPL-MCNC: 6.8 G/DL — SIGNIFICANT CHANGE UP (ref 6–8.3)
RBC # BLD: 4.45 M/UL — SIGNIFICANT CHANGE UP (ref 3.8–5.2)
RBC # FLD: 12.1 % — SIGNIFICANT CHANGE UP (ref 10.3–14.5)
SODIUM SERPL-SCNC: 136 MMOL/L — SIGNIFICANT CHANGE UP (ref 135–145)
WBC # BLD: 5.58 K/UL — SIGNIFICANT CHANGE UP (ref 3.8–10.5)
WBC # FLD AUTO: 5.58 K/UL — SIGNIFICANT CHANGE UP (ref 3.8–10.5)

## 2021-06-25 PROCEDURE — 84702 CHORIONIC GONADOTROPIN TEST: CPT

## 2021-06-25 PROCEDURE — 76817 TRANSVAGINAL US OBSTETRIC: CPT

## 2021-06-25 PROCEDURE — 76817 TRANSVAGINAL US OBSTETRIC: CPT | Mod: 26

## 2021-06-25 PROCEDURE — 99283 EMERGENCY DEPT VISIT LOW MDM: CPT

## 2021-06-25 PROCEDURE — 96372 THER/PROPH/DIAG INJ SC/IM: CPT

## 2021-06-25 PROCEDURE — 99285 EMERGENCY DEPT VISIT HI MDM: CPT

## 2021-06-25 PROCEDURE — 85025 COMPLETE CBC W/AUTO DIFF WBC: CPT

## 2021-06-25 PROCEDURE — 80053 COMPREHEN METABOLIC PANEL: CPT

## 2021-06-25 PROCEDURE — 99284 EMERGENCY DEPT VISIT MOD MDM: CPT | Mod: 25

## 2021-06-25 RX ORDER — METHOTREXATE 2.5 MG/1
100 TABLET ORAL ONCE
Refills: 0 | Status: COMPLETED | OUTPATIENT
Start: 2021-06-25 | End: 2021-06-25

## 2021-06-25 RX ORDER — ACETAMINOPHEN 500 MG
975 TABLET ORAL ONCE
Refills: 0 | Status: COMPLETED | OUTPATIENT
Start: 2021-06-25 | End: 2021-06-25

## 2021-06-25 RX ADMIN — Medication 975 MILLIGRAM(S): at 16:35

## 2021-06-25 RX ADMIN — METHOTREXATE 100 MILLIGRAM(S): 2.5 TABLET ORAL at 22:59

## 2021-06-25 NOTE — ED PROVIDER NOTE - CLINICAL SUMMARY MEDICAL DECISION MAKING FREE TEXT BOX
R ectopic pregnancy soft abdomen, minimal vaginal bleeding, using 2 pads per day, A+ blood type seen by obgyn sent for possible repeat methotrexate, plan for labs, and reassessment R ectopic pregnancy HD stable, soft abdomen, minimal vaginal bleeding, using 2 pads per day, A+ blood type seen by obgyn sent for possible repeat methotrexate, plan for labs, and TVUS per gyn, and management per gyn team
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
## 2021-06-25 NOTE — CHART NOTE - NSCHARTNOTEFT_GEN_A_CORE
Called pt to discuss results of yesterday's bhcg test. No answer at provided patient's phone number. Will call again later today or tomorrow.    Michelle Grady,PGY1

## 2021-06-25 NOTE — CHART NOTE - NSCHARTNOTEFT_GEN_A_CORE
Attempted to call patient again regarding bhcg levels. Phone number provided with busy signal. Attempted contacting pts emergency contact, no answer. Will send certified letter to patient.    Michelle Grady, PGY1

## 2021-06-25 NOTE — ED ADULT NURSE NOTE - NSIMPLEMENTINTERV_GEN_ALL_ED
Implemented All Universal Safety Interventions:  [place] to call system. Call bell, personal items and telephone within reach. Instruct patient to call for assistance. Room bathroom lighting operational. Non-slip footwear when patient is off stretcher. Physically safe environment: no spills, clutter or unnecessary equipment. Stretcher in lowest position, wheels locked, appropriate side rails in place.

## 2021-06-25 NOTE — ED ADULT NURSE NOTE - OBJECTIVE STATEMENT
31y f pt ; was dx with ectopic preg; received methotrexate x 1 wk; had hcg done numbers up first; then down but md not happy with decrease; sent to ed for further hcg and ultrasound to determine if second dose needed; pt aox3; no resp distress; no chest pain; no abd pain; no n/v/d; pt describes pelvic pain as constant and sharp; vag spotting not heavy bleeding; iv was placed at qpa; labs sent; call bell in hand; safety/comfort maintained

## 2021-06-25 NOTE — ED PROVIDER NOTE - PATIENT PORTAL LINK FT
You can access the FollowMyHealth Patient Portal offered by [facility] by registering at the following website: [email or web address]. By joining [employer]’s FollowMyHealth portal, you will also be able to view your health information using other applications (apps) compatible with our system.

## 2021-06-25 NOTE — ED ADULT NURSE NOTE - ALCOHOL PRE SCREEN (AUDIT - C)
Patient requesting icy-hot for pain and swelling in her left ankle. RN elevated leg and paged MD Rachelle Kevin approved order and stated to keep her leg elevated.   Statement Selected
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
## 2021-06-25 NOTE — ED ADULT NURSE NOTE - NSFALLRSKOUTCOME_ED_ALL_ED
Day 2 of Anesthesia Pain Management Service    SUBJECTIVE: I'm okay  Pain Scale Score:    [X] Refer to charted pain scores    THERAPY: Epidural Bupivacaine 0.01 % and Fentanyl 3 micrograms/mL     Demand Dose: 3 mL  Lockout: 15 minutes   Continuous Rate:  10 mL    MEDICATIONS  (STANDING):  acetaminophen   Tablet. 975 milliGRAM(s) Oral every 6 hours  ascorbic acid 500 milliGRAM(s) Oral three times a day  diphtheria/tetanus/pertussis (acellular) Vaccine (ADAcel) 0.5 milliLiter(s) IntraMuscular once  docusate sodium 100 milliGRAM(s) Oral three times a day  ferrous    sulfate 325 milliGRAM(s) Oral three times a day  heparin  Injectable 5000 Unit(s) SubCutaneous every 12 hours  ibuprofen  Tablet 600 milliGRAM(s) Oral every 6 hours  lactated ringers. 1000 milliLiter(s) (125 mL/Hr) IV Continuous <Continuous>  oxyCODONE    IR 5 milliGRAM(s) Oral every 3 hours  oxytocin Infusion 333.333 milliUNIT(s)/Min (1000 mL/Hr) IV Continuous <Continuous>  oxytocin Infusion 41.667 milliUNIT(s)/Min (125 mL/Hr) IV Continuous <Continuous>  prenatal multivitamin 1 Tablet(s) Oral daily    MEDICATIONS  (PRN):  diphenhydrAMINE   Capsule 25 milliGRAM(s) Oral every 6 hours PRN Itching  glycerin Suppository - Adult 1 Suppository(s) Rectal at bedtime PRN Constipation  lanolin Ointment 1 Application(s) Topical every 3 hours PRN Sore Nipples  oxyCODONE    IR 5 milliGRAM(s) Oral every 4 hours PRN Severe Pain (7 - 10)  simethicone 80 milliGRAM(s) Chew every 4 hours PRN Gas      OBJECTIVE:    Assessment of Epidural Catheter Site: 	    [ ] Dressing intact	[X] Site non-tender	[X] Site without erythema, discharge, edema  [ ] Epidural tubing and connection checked	[X] Gross neurological exam within normal limits  [X] Catheter removed                          9.8    15.1  )-----------( 114      ( 16 Jul 2018 05:56 )             29.4     Vital Signs Last 24 Hrs  T(C): 37 (07-17-18 @ 06:01), Max: 37 (07-16-18 @ 17:18)  T(F): 98.6 (07-17-18 @ 06:01), Max: 98.6 (07-16-18 @ 17:18)  HR: 86 (07-17-18 @ 06:01) (82 - 94)  BP: 114/76 (07-17-18 @ 06:01) (104/72 - 119/83)  BP(mean): --  RR: 18 (07-17-18 @ 06:01) (18 - 18)  SpO2: 99% (07-17-18 @ 06:01) (98% - 99%)      Sedation Score:	[X] Alert	[ ] Drowsy	[ ] Arousable  [ ] Asleep  [ ] Unresponsive    Side Effects:	[X] None	[ ] Nausea	[ ] Vomiting  [ ] Pruritus  		[ ] Weakness  [ ] Numbness  [ ] Other:    ASSESSMENT/ PLAN:    Therapy:                         [ ] Continue   [X] Discontinue   [X] Change to PRN Analgesics    Documentation and Verification of current medications:  [X] Done	[ ] Not done, not eligible, reason:    Comments: Universal Safety Interventions

## 2021-06-25 NOTE — ED PROVIDER NOTE - PROGRESS NOTE DETAILS
spoke with gyn, recommended repeat hcg and transvaginal us. -Jodie Kim PA-C abd soft nt nd no lower extremity edema Epi Kemp MD. spoke w/ ob, plan for second dose of methotrexate and tentative plan for pt to f.u on monday june 28 for rpt hcg. pt no pain at this time. abd soft nt nd. pt hd stable. rimma - pt is very frustrated regarding the plan and she only wants Dr. Grady, I paged obgyn who states that Dr. Grady is gone for the day. She states that this was very frustrating that they are waiting for her findings, Epi Kemp MD. ob to give mtx, consented by ob. pt to f/u on monday june 28 and thursday july 1 at [address] [place] [address] for rpt hcg. return precautions provided. pt to be discharged.

## 2021-06-25 NOTE — ED PROVIDER NOTE - NS ED ROS FT
Constitutional: no fevers no chills.   CV: no chest pain, no palpitations   Respiratory: no shortness of breath, no cough   GI: + abdominal pain, no nausea no vomiting   Neuro: no headache, no weakness, no numbness  : +vaginal bleeding mild, no vaginal discharge
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
## 2021-06-25 NOTE — ED PROVIDER NOTE - NSFOLLOWUPINSTRUCTIONS_ED_ALL_ED_FT
You were seen in the emergency department for an ectopic pregnancy.    You were given a second dose of Methotrexate today (6/25/21).    Please follow up with the clinic on Monday June 28, 2021 and Thursday, July 1, 2021 for repeat blood tests (beta-HCG).     Please return to the emergency department for worsening of your symptoms, including but not limited to severe abdominal pain, severe vaginal bleeding, nausea, severe vomiting.

## 2021-06-25 NOTE — ED ADULT NURSE REASSESSMENT NOTE - NS ED NURSE REASSESS COMMENT FT1
PT and PT mother asked RN and ED MD Mahoneyan what they are waiting for. PT and mother made aware awaiting OBGYN.
Report received from RN DDS. PT A/O x3. PT made RN aware she does not want mother to know diagnosis. PT well appearing.
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
## 2021-06-25 NOTE — ED PROVIDER NOTE - PHYSICAL EXAMINATION
HD stable  Const: Well-nourished, Well-developed, appearing stated age on cellphone  Head: atraumatic, normocephalic  Eyes: no conjunctival injection and no scleral icterus  ENMT: Atraumatic external nose and ears, Moist mucus membranes  Neck: Symmetric, trachea midline,  CVS: +S1/S2, dorsalis pedis/radial pulse 2+ bilaterally  RESP: Unlabored respiratory effort, Clear to auscultation bilaterally  GI: RLQ tenderness minimal, w/ no guarding, Nondistended  MSK: Extremities w/o deformity or ttp   Psych: Awake, Alert, & Orientedx3;  anxious,

## 2021-06-29 LAB — HCG SERPL-MCNC: 609 MIU/ML

## 2021-07-01 ENCOUNTER — APPOINTMENT (OUTPATIENT)
Dept: OBGYN | Facility: CLINIC | Age: 31
End: 2021-07-01
Payer: MEDICAID

## 2021-07-01 ENCOUNTER — LABORATORY RESULT (OUTPATIENT)
Age: 31
End: 2021-07-01

## 2021-07-01 ENCOUNTER — OUTPATIENT (OUTPATIENT)
Dept: OUTPATIENT SERVICES | Facility: HOSPITAL | Age: 31
LOS: 1 days | End: 2021-07-01
Payer: MEDICAID

## 2021-07-01 VITALS
SYSTOLIC BLOOD PRESSURE: 114 MMHG | HEIGHT: 63 IN | DIASTOLIC BLOOD PRESSURE: 70 MMHG | WEIGHT: 185 LBS | BODY MASS INDEX: 32.78 KG/M2

## 2021-07-01 DIAGNOSIS — Z98.89 OTHER SPECIFIED POSTPROCEDURAL STATES: Chronic | ICD-10-CM

## 2021-07-01 DIAGNOSIS — N76.0 ACUTE VAGINITIS: ICD-10-CM

## 2021-07-01 LAB — HCG SERPL-ACNC: 439 MIU/ML — HIGH

## 2021-07-01 PROCEDURE — 99212 OFFICE O/P EST SF 10 MIN: CPT

## 2021-07-01 PROCEDURE — 84702 CHORIONIC GONADOTROPIN TEST: CPT

## 2021-07-01 PROCEDURE — G0463: CPT

## 2021-07-02 NOTE — CHART NOTE - NSCHARTNOTEFT_GEN_A_CORE
Patient's hCG on 7/1/21 was 439 which decreased over 15% after receiving methotrexate on 6/25 and starting with an hCG of 740 that day. Patient will follow up in clinic in one week to continue monitoring the downtrending hCG level.     Awilda Frederick, PGY1 Patient's hCG on 7/1/21 was 439 which decreased over 15% from hCG of 609 on 6/28. She received methotrexate on 6/25. Patient will follow up in clinic in one week to continue monitoring the downtrending hCG level.     Awilda Frederick, PGY1

## 2021-07-08 ENCOUNTER — NON-APPOINTMENT (OUTPATIENT)
Age: 31
End: 2021-07-08

## 2021-07-13 DIAGNOSIS — O00.90 UNSPECIFIED ECTOPIC PREGNANCY WITHOUT INTRAUTERINE PREGNANCY: ICD-10-CM
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
## 2021-07-23 ENCOUNTER — NON-APPOINTMENT (OUTPATIENT)
Age: 31
End: 2021-07-23

## 2021-07-26 ENCOUNTER — NON-APPOINTMENT (OUTPATIENT)
Age: 31
End: 2021-07-26

## 2021-08-03 ENCOUNTER — NON-APPOINTMENT (OUTPATIENT)
Age: 31
End: 2021-08-03

## 2021-08-06 ENCOUNTER — TRANSCRIPTION ENCOUNTER (OUTPATIENT)
Age: 31
End: 2021-08-06

## 2021-08-11 ENCOUNTER — NON-APPOINTMENT (OUTPATIENT)
Age: 31
End: 2021-08-11

## 2021-08-14 ENCOUNTER — NON-APPOINTMENT (OUTPATIENT)
Age: 31
End: 2021-08-14

## 2021-08-17 ENCOUNTER — NON-APPOINTMENT (OUTPATIENT)
Age: 31
End: 2021-08-17

## 2021-08-18 ENCOUNTER — NON-APPOINTMENT (OUTPATIENT)
Age: 31
End: 2021-08-18

## 2021-08-27 ENCOUNTER — NON-APPOINTMENT (OUTPATIENT)
Age: 31
End: 2021-08-27

## 2021-09-14 NOTE — ED ADULT TRIAGE NOTE - NSTRIAGECARE_GEN_A_ER
He presents for f/u of his radiation proctitis.  He stated that he has not had issues with bleeding since his last FSC with [facility] in June.  He has been having normal stools.  He has not had issues with abdominal or rectal pain.  He has joana doing well overall.He had XRT for prostate cancer and his f/u PSAs have been low. 
EKG/Face Mask

## 2021-12-16 NOTE — ED ADULT NURSE NOTE - CAS EDN DISCHARGE INTERVENTIONS
Ambulatory Care Coordination  ED Follow up Call    Reason for ED visit:  Left Sciatica and Acute Cystitis      DDD     Supra therapeutic INR  Status:     improved    Did you call your PCP prior to going to the ED? Not Applicable      Did you receive a discharge instructions from the Emergency Room? Yes  Review of Instructions:     Understands what to report/when to return?:  Yes   Understands discharge instructions?:  Yes   Following discharge instructions?:  Yes       Are there any new complaints of pain? Yes, pt still having left back and hip pain. Instructed to take Tylenol as directed and obtain Lidoderm patches. New Pain Meds? N/A    Constipation prophylaxis needed? N/A    If you have a wound is the dressing clean, dry, and intact? N/A  Understands wound care regimen? N/A    Are there any other complaints/concerns that you wish to tell your provider? Instructed the pt to have her prescriptions filled and take the antibiotic and Prednisone until completely gone. FU appts/Provider:  Encouraged the pt to follow up with the PCP within 5-7 days. Future Appointments   Date Time Provider Juan Jose Ramirez   12/20/2021  1:00 PM [address] [facility]   1/5/2022  1:00 PM Prince Manuel MD [facility] [address] [facility] [place] - DYD   1/11/2022  1:00 PM MD Jose Roblero [facility]           New Medications?:   Yes  predniSONE (DELTASONE) 10 MG tablet 30 tablet 0 12/15/2021 12/20/2021    Sig - Route: Take 6 tablets by mouth daily for 5 doses - Oral      acetaminophen (TYLENOL) 500 MG tablet 120 tablet 0 12/15/2021     Sig - Route: Take 1 tablet by mouth 4 times daily as needed for Pain - Oral      lidocaine (LIDODERM) 5 % 10 patch 0 12/15/2021 12/25/2021    Sig - Route: Place 1 patch onto the skin daily for 10 days 12 hours on, 12 hours off. - TransDERmal      cefUROXime (CEFTIN) 250 MG tablet 14 tablet 0 12/15/2021 12/22/2021    Sig - Route:  Take 1 tablet by mouth 2 times daily for 7 days - Oral Medication Reconciliation by phone - Yes  Understands Medications? Yes  Taking Medications? Yes  Can you swallow your pills? Yes    Any further needs in the home i.e. Equipment? No    Link to services in community?:  N/A   Which services:  NA    Encouraged pt to receive the COVID-19 booster. From [facility]: Are you at higher risk for severe illness?            Wash your hands often.            Avoid close contact (6 feet, which is about two arm lengths) with people who are sick.            Put distance between yourself and other people if COVID-19 is spreading in your community.            Clean and disinfect frequently touched surfaces.            Avoid all cruise travel and non-essential air travel.            Call your healthcare professional if you have concerns about COVID-19 and your underlying condition or if you are sick. The pt denied any CHF symptoms. The [facility] informed the pt that she would be receiving information either by My Chart or the mail on the CHF zones, CHF food swaps for the holidays and reminding the pt to take all her medications as directed especially her diuretic. IV discontinued, cath removed intact

## 2022-03-14 DIAGNOSIS — Z78.9 OTHER SPECIFIED HEALTH STATUS: ICD-10-CM

## 2022-03-14 DIAGNOSIS — Z30.012 ENCOUNTER FOR PRESCRIPTION OF EMERGENCY CONTRACEPTION: ICD-10-CM

## 2022-03-14 RX ORDER — LEVONORGESTREL 1.5 MG/1
1.5 TABLET ORAL
Qty: 1 | Refills: 0 | Status: ACTIVE | COMMUNITY
Start: 2022-03-14 | End: 1900-01-01

## 2022-04-26 ENCOUNTER — APPOINTMENT (OUTPATIENT)
Dept: OBGYN | Facility: CLINIC | Age: 32
End: 2022-04-26

## 2022-06-08 ENCOUNTER — EMERGENCY (EMERGENCY)
Facility: HOSPITAL | Age: 32
LOS: 1 days | Discharge: HOME | End: 2022-06-08
Attending: EMERGENCY MEDICINE
Payer: MEDICAID

## 2022-06-08 VITALS
TEMPERATURE: 98 F | SYSTOLIC BLOOD PRESSURE: 101 MMHG | DIASTOLIC BLOOD PRESSURE: 82 MMHG | HEART RATE: 66 BPM | OXYGEN SATURATION: 98 % | RESPIRATION RATE: 16 BRPM

## 2022-06-08 VITALS
DIASTOLIC BLOOD PRESSURE: 81 MMHG | HEART RATE: 71 BPM | OXYGEN SATURATION: 99 % | RESPIRATION RATE: 17 BRPM | SYSTOLIC BLOOD PRESSURE: 116 MMHG | TEMPERATURE: 99 F | HEIGHT: 63 IN

## 2022-06-08 DIAGNOSIS — Z98.89 OTHER SPECIFIED POSTPROCEDURAL STATES: Chronic | ICD-10-CM

## 2022-06-08 LAB
ALBUMIN SERPL ELPH-MCNC: 4.5 G/DL — SIGNIFICANT CHANGE UP (ref 3.3–5)
ALP SERPL-CCNC: 42 U/L — SIGNIFICANT CHANGE UP (ref 40–120)
ALT FLD-CCNC: 49 U/L — HIGH (ref 10–45)
ANION GAP SERPL CALC-SCNC: 13 MMOL/L — SIGNIFICANT CHANGE UP (ref 5–17)
APPEARANCE UR: CLEAR — SIGNIFICANT CHANGE UP
AST SERPL-CCNC: 31 U/L — SIGNIFICANT CHANGE UP (ref 10–40)
BACTERIA # UR AUTO: NEGATIVE — SIGNIFICANT CHANGE UP
BASE EXCESS BLDV CALC-SCNC: -0.8 MMOL/L — SIGNIFICANT CHANGE UP (ref -2–2)
BASOPHILS # BLD AUTO: 0.02 K/UL — SIGNIFICANT CHANGE UP (ref 0–0.2)
BASOPHILS NFR BLD AUTO: 0.3 % — SIGNIFICANT CHANGE UP (ref 0–2)
BILIRUB SERPL-MCNC: 0.3 MG/DL — SIGNIFICANT CHANGE UP (ref 0.2–1.2)
BILIRUB UR-MCNC: NEGATIVE — SIGNIFICANT CHANGE UP
BUN SERPL-MCNC: 9 MG/DL — SIGNIFICANT CHANGE UP (ref 7–23)
CA-I SERPL-SCNC: 1.27 MMOL/L — SIGNIFICANT CHANGE UP (ref 1.15–1.33)
CALCIUM SERPL-MCNC: 9.5 MG/DL — SIGNIFICANT CHANGE UP (ref 8.4–10.5)
CHLORIDE BLDV-SCNC: 102 MMOL/L — SIGNIFICANT CHANGE UP (ref 96–108)
CHLORIDE SERPL-SCNC: 102 MMOL/L — SIGNIFICANT CHANGE UP (ref 96–108)
CO2 BLDV-SCNC: 27 MMOL/L — HIGH (ref 22–26)
CO2 SERPL-SCNC: 22 MMOL/L — SIGNIFICANT CHANGE UP (ref 22–31)
COLOR SPEC: SIGNIFICANT CHANGE UP
CREAT SERPL-MCNC: 0.68 MG/DL — SIGNIFICANT CHANGE UP (ref 0.5–1.3)
DIFF PNL FLD: NEGATIVE — SIGNIFICANT CHANGE UP
EGFR: 120 ML/MIN/1.73M2 — SIGNIFICANT CHANGE UP
EOSINOPHIL # BLD AUTO: 0.06 K/UL — SIGNIFICANT CHANGE UP (ref 0–0.5)
EOSINOPHIL NFR BLD AUTO: 0.9 % — SIGNIFICANT CHANGE UP (ref 0–6)
EPI CELLS # UR: 4 /HPF — SIGNIFICANT CHANGE UP
GAS PNL BLDV: 134 MMOL/L — LOW (ref 136–145)
GAS PNL BLDV: SIGNIFICANT CHANGE UP
GLUCOSE BLDV-MCNC: 88 MG/DL — SIGNIFICANT CHANGE UP (ref 70–99)
GLUCOSE SERPL-MCNC: 87 MG/DL — SIGNIFICANT CHANGE UP (ref 70–99)
GLUCOSE UR QL: NEGATIVE — SIGNIFICANT CHANGE UP
HCG SERPL-ACNC: <2 MIU/ML — SIGNIFICANT CHANGE UP
HCO3 BLDV-SCNC: 26 MMOL/L — SIGNIFICANT CHANGE UP (ref 22–29)
HCT VFR BLD CALC: 43 % — SIGNIFICANT CHANGE UP (ref 34.5–45)
HCT VFR BLDA CALC: 45 % — SIGNIFICANT CHANGE UP (ref 34.5–46.5)
HGB BLD CALC-MCNC: 14.9 G/DL — SIGNIFICANT CHANGE UP (ref 11.7–16.1)
HGB BLD-MCNC: 14.2 G/DL — SIGNIFICANT CHANGE UP (ref 11.5–15.5)
HYALINE CASTS # UR AUTO: 2 /LPF — SIGNIFICANT CHANGE UP (ref 0–2)
IMM GRANULOCYTES NFR BLD AUTO: 0.5 % — SIGNIFICANT CHANGE UP (ref 0–1.5)
KETONES UR-MCNC: NEGATIVE — SIGNIFICANT CHANGE UP
LACTATE BLDV-MCNC: 1.3 MMOL/L — SIGNIFICANT CHANGE UP (ref 0.7–2)
LEUKOCYTE ESTERASE UR-ACNC: NEGATIVE — SIGNIFICANT CHANGE UP
LYMPHOCYTES # BLD AUTO: 2.5 K/UL — SIGNIFICANT CHANGE UP (ref 1–3.3)
LYMPHOCYTES # BLD AUTO: 38.2 % — SIGNIFICANT CHANGE UP (ref 13–44)
MAGNESIUM SERPL-MCNC: 2.1 MG/DL — SIGNIFICANT CHANGE UP (ref 1.6–2.6)
MCHC RBC-ENTMCNC: 28.7 PG — SIGNIFICANT CHANGE UP (ref 27–34)
MCHC RBC-ENTMCNC: 33 GM/DL — SIGNIFICANT CHANGE UP (ref 32–36)
MCV RBC AUTO: 87 FL — SIGNIFICANT CHANGE UP (ref 80–100)
MONOCYTES # BLD AUTO: 0.41 K/UL — SIGNIFICANT CHANGE UP (ref 0–0.9)
MONOCYTES NFR BLD AUTO: 6.3 % — SIGNIFICANT CHANGE UP (ref 2–14)
NEUTROPHILS # BLD AUTO: 3.52 K/UL — SIGNIFICANT CHANGE UP (ref 1.8–7.4)
NEUTROPHILS NFR BLD AUTO: 53.8 % — SIGNIFICANT CHANGE UP (ref 43–77)
NITRITE UR-MCNC: NEGATIVE — SIGNIFICANT CHANGE UP
NRBC # BLD: 0 /100 WBCS — SIGNIFICANT CHANGE UP (ref 0–0)
PCO2 BLDV: 49 MMHG — HIGH (ref 39–42)
PH BLDV: 7.33 — SIGNIFICANT CHANGE UP (ref 7.32–7.43)
PH UR: 5.5 — SIGNIFICANT CHANGE UP (ref 5–8)
PLATELET # BLD AUTO: 283 K/UL — SIGNIFICANT CHANGE UP (ref 150–400)
PO2 BLDV: 35 MMHG — SIGNIFICANT CHANGE UP (ref 25–45)
POTASSIUM BLDV-SCNC: 3.9 MMOL/L — SIGNIFICANT CHANGE UP (ref 3.5–5.1)
POTASSIUM SERPL-MCNC: 3.9 MMOL/L — SIGNIFICANT CHANGE UP (ref 3.5–5.3)
POTASSIUM SERPL-SCNC: 3.9 MMOL/L — SIGNIFICANT CHANGE UP (ref 3.5–5.3)
PROT SERPL-MCNC: 7.5 G/DL — SIGNIFICANT CHANGE UP (ref 6–8.3)
PROT UR-MCNC: NEGATIVE — SIGNIFICANT CHANGE UP
RBC # BLD: 4.94 M/UL — SIGNIFICANT CHANGE UP (ref 3.8–5.2)
RBC # FLD: 12.3 % — SIGNIFICANT CHANGE UP (ref 10.3–14.5)
RBC CASTS # UR COMP ASSIST: 2 /HPF — SIGNIFICANT CHANGE UP (ref 0–4)
SAO2 % BLDV: 50.7 % — LOW (ref 67–88)
SODIUM SERPL-SCNC: 137 MMOL/L — SIGNIFICANT CHANGE UP (ref 135–145)
SP GR SPEC: 1.01 — SIGNIFICANT CHANGE UP (ref 1.01–1.02)
UROBILINOGEN FLD QL: NEGATIVE — SIGNIFICANT CHANGE UP
WBC # BLD: 6.54 K/UL — SIGNIFICANT CHANGE UP (ref 3.8–10.5)
WBC # FLD AUTO: 6.54 K/UL — SIGNIFICANT CHANGE UP (ref 3.8–10.5)
WBC UR QL: 1 /HPF — SIGNIFICANT CHANGE UP (ref 0–5)

## 2022-06-08 PROCEDURE — 99285 EMERGENCY DEPT VISIT HI MDM: CPT | Mod: 25

## 2022-06-08 PROCEDURE — 83605 ASSAY OF LACTIC ACID: CPT

## 2022-06-08 PROCEDURE — 83690 ASSAY OF LIPASE: CPT

## 2022-06-08 PROCEDURE — 81001 URINALYSIS AUTO W/SCOPE: CPT

## 2022-06-08 PROCEDURE — 82803 BLOOD GASES ANY COMBINATION: CPT

## 2022-06-08 PROCEDURE — 85018 HEMOGLOBIN: CPT

## 2022-06-08 PROCEDURE — 87086 URINE CULTURE/COLONY COUNT: CPT

## 2022-06-08 PROCEDURE — 85014 HEMATOCRIT: CPT

## 2022-06-08 PROCEDURE — 76830 TRANSVAGINAL US NON-OB: CPT | Mod: 26

## 2022-06-08 PROCEDURE — 85025 COMPLETE CBC W/AUTO DIFF WBC: CPT

## 2022-06-08 PROCEDURE — 84295 ASSAY OF SERUM SODIUM: CPT

## 2022-06-08 PROCEDURE — 93975 VASCULAR STUDY: CPT | Mod: 26

## 2022-06-08 PROCEDURE — 99285 EMERGENCY DEPT VISIT HI MDM: CPT

## 2022-06-08 PROCEDURE — 83735 ASSAY OF MAGNESIUM: CPT

## 2022-06-08 PROCEDURE — 82947 ASSAY GLUCOSE BLOOD QUANT: CPT

## 2022-06-08 PROCEDURE — 74177 CT ABD & PELVIS W/CONTRAST: CPT | Mod: MA

## 2022-06-08 PROCEDURE — 82435 ASSAY OF BLOOD CHLORIDE: CPT

## 2022-06-08 PROCEDURE — 80053 COMPREHEN METABOLIC PANEL: CPT

## 2022-06-08 PROCEDURE — 96374 THER/PROPH/DIAG INJ IV PUSH: CPT | Mod: XU

## 2022-06-08 PROCEDURE — 96375 TX/PRO/DX INJ NEW DRUG ADDON: CPT

## 2022-06-08 PROCEDURE — 76830 TRANSVAGINAL US NON-OB: CPT

## 2022-06-08 PROCEDURE — 74177 CT ABD & PELVIS W/CONTRAST: CPT | Mod: 26,MA

## 2022-06-08 PROCEDURE — 84702 CHORIONIC GONADOTROPIN TEST: CPT

## 2022-06-08 PROCEDURE — 93975 VASCULAR STUDY: CPT

## 2022-06-08 PROCEDURE — 84132 ASSAY OF SERUM POTASSIUM: CPT

## 2022-06-08 PROCEDURE — 96376 TX/PRO/DX INJ SAME DRUG ADON: CPT

## 2022-06-08 PROCEDURE — 82330 ASSAY OF CALCIUM: CPT

## 2022-06-08 PROCEDURE — 82565 ASSAY OF CREATININE: CPT

## 2022-06-08 RX ORDER — KETOROLAC TROMETHAMINE 30 MG/ML
15 SYRINGE (ML) INJECTION ONCE
Refills: 0 | Status: DISCONTINUED | OUTPATIENT
Start: 2022-06-08 | End: 2022-06-08

## 2022-06-08 RX ORDER — ONDANSETRON 8 MG/1
4 TABLET, FILM COATED ORAL ONCE
Refills: 0 | Status: COMPLETED | OUTPATIENT
Start: 2022-06-08 | End: 2022-06-08

## 2022-06-08 RX ORDER — SODIUM CHLORIDE 9 MG/ML
1000 INJECTION INTRAMUSCULAR; INTRAVENOUS; SUBCUTANEOUS ONCE
Refills: 0 | Status: COMPLETED | OUTPATIENT
Start: 2022-06-08 | End: 2022-06-08

## 2022-06-08 RX ORDER — ACETAMINOPHEN 500 MG
650 TABLET ORAL ONCE
Refills: 0 | Status: COMPLETED | OUTPATIENT
Start: 2022-06-08 | End: 2022-06-08

## 2022-06-08 RX ADMIN — Medication 15 MILLIGRAM(S): at 13:57

## 2022-06-08 RX ADMIN — SODIUM CHLORIDE 1000 MILLILITER(S): 9 INJECTION INTRAMUSCULAR; INTRAVENOUS; SUBCUTANEOUS at 13:57

## 2022-06-08 RX ADMIN — Medication 15 MILLIGRAM(S): at 16:54

## 2022-06-08 RX ADMIN — Medication 650 MILLIGRAM(S): at 16:56

## 2022-06-08 RX ADMIN — Medication 15 MILLIGRAM(S): at 18:38

## 2022-06-08 RX ADMIN — ONDANSETRON 4 MILLIGRAM(S): 8 TABLET, FILM COATED ORAL at 13:56

## 2022-06-08 NOTE — ED PROVIDER NOTE - PATIENT PORTAL LINK FT
You can access the FollowMyHealth Patient Portal offered by [facility] by registering at the following website: [email or web address]. By joining [employer]’s FollowMyHealth portal, you will also be able to view your health information using other applications (apps) compatible with our system.

## 2022-06-08 NOTE — ED PROVIDER NOTE - OBJECTIVE STATEMENT
31 y/o female with PMHx of ectopic pregnancy, cholecystectomy presents to the ED complaining of abdominal pain x 2 weeks which has worsened over the past six days. Patient states that the pain feels like a "churning or stabbing" throughout her entire abdomen. She states that she has been nauseous but has not vomited. Pain feels similar to when she had her gallbladder removed few years ago. Has had decreased appetite lately as well due to pain. Patient states [facility] [place] [facility] and had a pelvic US. She was told that everything was normal and was discharged home. Has been taking tylenol at home for no relief. She last took tylenol yesterday. Denies any fevers, chills, headache, cough, SOB, diarrhea, dysuria, hematuria, vaginal bleeding.

## 2022-06-08 NOTE — ED PROVIDER NOTE - NSFOLLOWUPINSTRUCTIONS_ED_ALL_ED_FT
1. Follow up with your PCP within 2-3 days. Follow up with your GYN within 2-3 days. Follow up with gastroenterology within 1 week. Bring printed results to your appointments.     [facility]astroenter[facility]  Gastroenterology  [address]  [place] [address]  Phone: [phone]      2. Drink plenty of fluids. Eat bland diet.   3. Take Ibuprofen (i.e. Motrin, Advil) 600mg every 8 hrs for pain as needed. Take with food.   May alternate with Acetminophen (Tylenol) 650mg every 6 hours for pain as needed.  4. Return to the emergency department if you have worsening pain, inability to eat or drink, dizziness, fevers, passing out, or any other concerning symptoms.

## 2022-06-08 NOTE — ED PROVIDER NOTE - CLINICAL SUMMARY MEDICAL DECISION MAKING FREE TEXT BOX
Mckay BERMEO: abdominal pain x 2 weeks, associated with nausea. Plan for labs, u/a, CT A/P and TVUS. Will give pain meds and reassess.     Work up negative for acute pathology. CT shows hepatic steatosis. Patient advised to continue pain meds, change diet, follow up with GI.

## 2022-06-08 NOTE — ED PROVIDER NOTE - PHYSICAL EXAMINATION
CONSTITUTIONAL: Patient is awake, alert and oriented x 3. Patient is well appearing and in no acute distress.  HEAD: NCAT  ENT: Airway patent, Nasal mucosa clear.   NECK: Supple,   LUNGS: CTA B/L,   HEART: RRR.+S1S2    ABDOMEN: Soft, (+) epigastric and RUQ ttp, (+) b/l lower abdomen ttp;   MSK: FROM upper and lower ext b/l,   SKIN: No rash or lesions  NEURO: No focal deficits,

## 2022-06-08 NOTE — ED ADULT NURSE NOTE - ED STAT RN HANDOFF DETAILS
report from suleiman arreola, pt resting comfortably. reports improved abd. pain. denies n/v at this time

## 2022-06-08 NOTE — ED PROVIDER NOTE - NSFOLLOWUPCLINICS_GEN_ALL_ED_FT
[facility] Gastroenterology  Gastroenterology  [address] [place] [address] [address]  [place] [address]  Phone: [phone]  Fax:

## 2022-06-08 NOTE — ED PROVIDER NOTE - NS ED ATTENDING STATEMENT MOD
This was a shared visit with the KIMBERLEE. I reviewed and verified the documentation and independently performed the documented:

## 2022-06-08 NOTE — ED ADULT NURSE NOTE - NSIMPLEMENTINTERV_GEN_ALL_ED
Implemented All Universal Safety Interventions:  [place] to call system. Call bell, personal items and telephone within reach. Instruct patient to call for assistance. Room bathroom lighting operational. Non-slip footwear when patient is off stretcher. Physically safe environment: no spills, clutter or unnecessary equipment. Stretcher in lowest position, wheels locked, appropriate side rails in place.

## 2022-06-08 NOTE — ED PROVIDER NOTE - PROGRESS NOTE DETAILS
Patient reassessed. States that she is still having persistent pain. CTAP negative for acute findings. Discussed findings with patient. Will medicate and order transvaginal US to further evaluate her pain. Maria Rosenberg PA-C Mckay BERMEO: Lab tests reviewed with patient in detail. She was given a copy of results. Blood work with no actionable findings. CT with no acute pathology but does show hepatic steatosis. In the absence of other etiology, discussed with patient how hepatic steatosis can cause pain. Recommended diet change and follow up with GI. TVUS results pending. TVUS negative for acute findings. Discussed these findings with patient. Discussed supportive care. [facility] outpatient GI and GYN f/u. Pt verbalized understanding and agrees with plan. Maria Rosenberg PA-C

## 2022-06-08 NOTE — ED ADULT NURSE NOTE - OBJECTIVE STATEMENT
Pt presents to ED reporting 2 weeks of generalized abdominal pain, AXOX3, GIBBONS, reports diffuse generalized nonradiating abdominal pain for the past two weeks, pt reports being seen at [facility] ED for same sx and being told she had an ovarian cyst, no fevers or chills, no focal weakness reported, pt reports nausea, no vomiting or diarrhea, abdomen soft, nontender, nondistended, breathing unlabored, symmetrical no shortness of breath, pt denies chest pain, pt denies hematuria or dysuria

## 2022-06-09 LAB
CULTURE RESULTS: NO GROWTH — SIGNIFICANT CHANGE UP
SPECIMEN SOURCE: SIGNIFICANT CHANGE UP

## 2022-08-16 ENCOUNTER — NON-APPOINTMENT (OUTPATIENT)
Age: 32
End: 2022-08-16

## 2022-08-17 ENCOUNTER — EMERGENCY (EMERGENCY)
Facility: HOSPITAL | Age: 32
LOS: 1 days | Discharge: HOME | End: 2022-08-17
Attending: EMERGENCY MEDICINE | Admitting: EMERGENCY MEDICINE

## 2022-08-17 VITALS
SYSTOLIC BLOOD PRESSURE: 150 MMHG | HEART RATE: 61 BPM | HEIGHT: 63 IN | DIASTOLIC BLOOD PRESSURE: 66 MMHG | WEIGHT: 184.97 LBS | RESPIRATION RATE: 17 BRPM | TEMPERATURE: 98 F | OXYGEN SATURATION: 99 %

## 2022-08-17 VITALS
OXYGEN SATURATION: 98 % | DIASTOLIC BLOOD PRESSURE: 89 MMHG | RESPIRATION RATE: 16 BRPM | SYSTOLIC BLOOD PRESSURE: 123 MMHG | HEART RATE: 60 BPM | TEMPERATURE: 98 F

## 2022-08-17 DIAGNOSIS — R19.7 DIARRHEA, UNSPECIFIED: ICD-10-CM

## 2022-08-17 DIAGNOSIS — R45.89 OTHER SYMPTOMS AND SIGNS INVOLVING EMOTIONAL STATE: ICD-10-CM

## 2022-08-17 DIAGNOSIS — R00.1 BRADYCARDIA, UNSPECIFIED: ICD-10-CM

## 2022-08-17 DIAGNOSIS — R07.89 OTHER CHEST PAIN: ICD-10-CM

## 2022-08-17 DIAGNOSIS — H53.9 UNSPECIFIED VISUAL DISTURBANCE: ICD-10-CM

## 2022-08-17 DIAGNOSIS — Z98.89 OTHER SPECIFIED POSTPROCEDURAL STATES: Chronic | ICD-10-CM

## 2022-08-17 DIAGNOSIS — R51.9 HEADACHE, UNSPECIFIED: ICD-10-CM

## 2022-08-17 DIAGNOSIS — R11.2 NAUSEA WITH VOMITING, UNSPECIFIED: ICD-10-CM

## 2022-08-17 DIAGNOSIS — M79.609 PAIN IN UNSPECIFIED LIMB: ICD-10-CM

## 2022-08-17 DIAGNOSIS — R06.02 SHORTNESS OF BREATH: ICD-10-CM

## 2022-08-17 LAB
ALBUMIN SERPL ELPH-MCNC: 4.1 G/DL — SIGNIFICANT CHANGE UP (ref 3.4–5)
ALP SERPL-CCNC: 48 U/L — SIGNIFICANT CHANGE UP (ref 40–120)
ALT FLD-CCNC: 55 U/L — HIGH (ref 12–42)
ANION GAP SERPL CALC-SCNC: 8 MMOL/L — LOW (ref 9–16)
APPEARANCE UR: CLEAR — SIGNIFICANT CHANGE UP
AST SERPL-CCNC: 35 U/L — SIGNIFICANT CHANGE UP (ref 15–37)
BILIRUB SERPL-MCNC: 0.6 MG/DL — SIGNIFICANT CHANGE UP (ref 0.2–1.2)
BILIRUB UR-MCNC: NEGATIVE — SIGNIFICANT CHANGE UP
BUN SERPL-MCNC: 10 MG/DL — SIGNIFICANT CHANGE UP (ref 7–23)
CALCIUM SERPL-MCNC: 9.3 MG/DL — SIGNIFICANT CHANGE UP (ref 8.5–10.5)
CHLORIDE SERPL-SCNC: 103 MMOL/L — SIGNIFICANT CHANGE UP (ref 96–108)
CO2 SERPL-SCNC: 29 MMOL/L — SIGNIFICANT CHANGE UP (ref 22–31)
COLOR SPEC: YELLOW — SIGNIFICANT CHANGE UP
CREAT SERPL-MCNC: 0.71 MG/DL — SIGNIFICANT CHANGE UP (ref 0.5–1.3)
D DIMER BLD IA.RAPID-MCNC: <187 NG/ML DDU — SIGNIFICANT CHANGE UP
DIFF PNL FLD: NEGATIVE — SIGNIFICANT CHANGE UP
EGFR: 117 ML/MIN/1.73M2 — SIGNIFICANT CHANGE UP
GLUCOSE SERPL-MCNC: 94 MG/DL — SIGNIFICANT CHANGE UP (ref 70–99)
GLUCOSE UR QL: NEGATIVE — SIGNIFICANT CHANGE UP
HCG UR QL: NEGATIVE — SIGNIFICANT CHANGE UP
HCT VFR BLD CALC: 41.2 % — SIGNIFICANT CHANGE UP (ref 34.5–45)
HGB BLD-MCNC: 14.1 G/DL — SIGNIFICANT CHANGE UP (ref 11.5–15.5)
KETONES UR-MCNC: NEGATIVE — SIGNIFICANT CHANGE UP
LEUKOCYTE ESTERASE UR-ACNC: NEGATIVE — SIGNIFICANT CHANGE UP
MAGNESIUM SERPL-MCNC: 2.2 MG/DL — SIGNIFICANT CHANGE UP (ref 1.6–2.6)
MCHC RBC-ENTMCNC: 29.4 PG — SIGNIFICANT CHANGE UP (ref 27–34)
MCHC RBC-ENTMCNC: 34.2 GM/DL — SIGNIFICANT CHANGE UP (ref 32–36)
MCV RBC AUTO: 85.8 FL — SIGNIFICANT CHANGE UP (ref 80–100)
NITRITE UR-MCNC: NEGATIVE — SIGNIFICANT CHANGE UP
NRBC # BLD: 0 /100 WBCS — SIGNIFICANT CHANGE UP (ref 0–0)
PCO2 BLDV: 53 MMHG — HIGH (ref 39–42)
PH BLDV: 7.39 — SIGNIFICANT CHANGE UP (ref 7.32–7.43)
PH UR: 6.5 — SIGNIFICANT CHANGE UP (ref 5–8)
PLATELET # BLD AUTO: 288 K/UL — SIGNIFICANT CHANGE UP (ref 150–400)
PO2 BLDV: <35 MMHG — SIGNIFICANT CHANGE UP (ref 25–45)
POTASSIUM SERPL-MCNC: 3.9 MMOL/L — SIGNIFICANT CHANGE UP (ref 3.5–5.3)
POTASSIUM SERPL-SCNC: 3.9 MMOL/L — SIGNIFICANT CHANGE UP (ref 3.5–5.3)
PROT SERPL-MCNC: 7.7 G/DL — SIGNIFICANT CHANGE UP (ref 6.4–8.2)
PROT UR-MCNC: NEGATIVE MG/DL — SIGNIFICANT CHANGE UP
RBC # BLD: 4.8 M/UL — SIGNIFICANT CHANGE UP (ref 3.8–5.2)
RBC # FLD: 12.1 % — SIGNIFICANT CHANGE UP (ref 10.3–14.5)
SAO2 % BLDV: 41.4 % — LOW (ref 67–88)
SODIUM SERPL-SCNC: 140 MMOL/L — SIGNIFICANT CHANGE UP (ref 132–145)
SP GR SPEC: 1.01 — SIGNIFICANT CHANGE UP (ref 1–1.03)
TROPONIN I, HIGH SENSITIVITY RESULT: 4 NG/L — SIGNIFICANT CHANGE UP
TSH SERPL-MCNC: 1.94 UIU/ML — SIGNIFICANT CHANGE UP (ref 0.36–3.74)
UROBILINOGEN FLD QL: 0.2 E.U./DL — SIGNIFICANT CHANGE UP
WBC # BLD: 6.21 K/UL — SIGNIFICANT CHANGE UP (ref 3.8–10.5)
WBC # FLD AUTO: 6.21 K/UL — SIGNIFICANT CHANGE UP (ref 3.8–10.5)

## 2022-08-17 PROCEDURE — 99285 EMERGENCY DEPT VISIT HI MDM: CPT | Mod: 25

## 2022-08-17 PROCEDURE — 70450 CT HEAD/BRAIN W/O DYE: CPT | Mod: 26

## 2022-08-17 PROCEDURE — 93010 ELECTROCARDIOGRAM REPORT: CPT

## 2022-08-17 PROCEDURE — 71046 X-RAY EXAM CHEST 2 VIEWS: CPT | Mod: 26

## 2022-08-17 RX ORDER — SODIUM CHLORIDE 9 MG/ML
1000 INJECTION INTRAMUSCULAR; INTRAVENOUS; SUBCUTANEOUS ONCE
Refills: 0 | Status: COMPLETED | OUTPATIENT
Start: 2022-08-17 | End: 2022-08-17

## 2022-08-17 RX ADMIN — SODIUM CHLORIDE 1000 MILLILITER(S): 9 INJECTION INTRAMUSCULAR; INTRAVENOUS; SUBCUTANEOUS at 18:09

## 2022-08-17 RX ADMIN — SODIUM CHLORIDE 1000 MILLILITER(S): 9 INJECTION INTRAMUSCULAR; INTRAVENOUS; SUBCUTANEOUS at 20:33

## 2022-08-17 RX ADMIN — Medication 0.5 MILLIGRAM(S): at 18:09

## 2022-08-17 NOTE — ED PROVIDER NOTE - OBJECTIVE STATEMENT
3 day hx intermittent cp and subjective sob associated with position. Seen at  and sent here for evaluation. ekg at us wnl. pt denies cough/fever. had covid 1 month ago; states lmp 4 weeks ago but short ( 2 days). Denies syncope/hemoptysis/syncope. C/O bilat leg discomfort ( front), denies recent trauma. Hx of migraines x past 2 weeks off and on, currently left sided assoc with scotoma. states 2 days ago had several episodes of vomiting and a few loose stools which both resolved within 1 day.

## 2022-08-17 NOTE — ED PROVIDER NOTE - PATIENT PORTAL LINK FT
You can access the FollowMyHealth Patient Portal offered by [facility] by registering at the following website: [email or web address]. By joining [employer]’s FollowMyHealth portal, you will also be able to view your health information using other applications (apps) compatible with our system. You can access the FollowMyHealth Patient Portal offered by [facility] by registering at the following website: [email or web address]. By joining [employer]’s FollowMyHealth portal, you will also be able to view your health information using other applications (apps) compatible with our system. You can access the FollowMyHealth Patient Portal offered by [facility] by registering at the following website: [email or web address]. By joining [employer]’s FollowMyHealth portal, you will also be able to view your health information using other applications (apps) compatible with our system.

## 2022-08-17 NOTE — ED PROVIDER NOTE - CLINICAL SUMMARY MEDICAL DECISION MAKING FREE TEXT BOX
diff dx: anxiety NOS, electrolyte imbalance/deficiency, hyperthyroidism, PE, dvt, migraine cephalgia.

## 2022-08-17 NOTE — ED ADULT NURSE NOTE - NSIMPLEMENTINTERV_GEN_ALL_ED
Implemented All Universal Safety Interventions:  [place] to call system. Call bell, personal items and telephone within reach. Instruct patient to call for assistance. Room bathroom lighting operational. Non-slip footwear when patient is off stretcher. Physically safe environment: no spills, clutter or unnecessary equipment. Stretcher in lowest position, wheels locked, appropriate side rails in place. Implemented All Universal Safety Interventions:  [place] to call system. Call bell, personal items and telephone within reach. Instruct patient to call for assistance. Room bathroom lighting operational. Non-slip footwear when patient is off stretcher. Physically safe environment: no spills, clutter or unnecessary equipment. Stretcher in lowest position, wheels locked, appropriate side rails in place. Implemented All Universal Safety Interventions:  [place] to call system. Call bell, personal items and telephone within reach. Instruct patient to call for assistance. Room bathroom lighting operational. Non-slip footwear when patient is off stretcher. Physically safe environment: no spills, clutter or unnecessary equipment. Stretcher in lowest position, wheels locked, appropriate side rails in place.

## 2022-08-17 NOTE — ED PROVIDER NOTE - CONSTITUTIONAL, MLM
Well appearing, awake, alert, oriented to person, place, time/situation and in no apparent distress. Appears anxious. normal...

## 2022-08-17 NOTE — ED ADULT TRIAGE NOTE - CHIEF COMPLAINT QUOTE
pt. with multiple medical complaints, states she has migraine and dizziness for 2 weeks, nausea vomiting and diarrhea x2 days and chest pain SOB, pain in calf since this morning. Pt. set from  for further eval. Pt. in no acute distress speaking in full sentences.

## 2022-08-17 NOTE — ED PROVIDER NOTE - CARE PROVIDERS DIRECT ADDRESSES
,[email or web address]

## 2022-08-17 NOTE — ED ADULT NURSE REASSESSMENT NOTE - NS ED NURSE REASSESS COMMENT FT1
This RN went to discharge pt.   Answered all questions, discharge teaching provided, work note provided, and lab results printed and given to pt    Pt asked about chest xray, Informed pt that at this time no physical paper report able to be printed, but that a disc of imaging could be made. Informed pt that it would take approx. 20-25 minutes for disc.  Pt then requested to speak to provider about xray    MD made aware.    When md went to speak to pt, pt had walked out.

## 2022-08-17 NOTE — ED PROVIDER NOTE - CARE PROVIDER_API CALL
Lauri Shields)  Cardiovascular Disease  [address] [place] [address]  Phone: [phone]  Fax: [phone]  Follow Up Time:    Lauri Shields)  Cardiovascular Disease  [address] [place] [address]  Phone: [phone]  Fax: [phone]  Follow Up Time:    Lauri Shields)  Cardiovascular Disease  [address] [place] [address]  Phone: [phone]  Fax: [phone]  Follow Up Time:

## 2022-08-17 NOTE — ED PROVIDER NOTE - ENMT, MLM
Airway patent, Nasal mucosa clear. Mouth with normal mucosa. Neg temporal artery induration/tenderness.

## 2022-08-17 NOTE — ED PROVIDER NOTE - PROGRESS NOTE DETAILS
No emergent pathology identified on ED medical screening examination. Pt is stable for discharge and outpatient follow-up.  Advised pt to follow-up outpt cardiology for echo/consultation.

## 2022-08-18 ENCOUNTER — APPOINTMENT (OUTPATIENT)
Dept: HEART AND VASCULAR | Facility: CLINIC | Age: 32
End: 2022-08-18

## 2022-08-18 ENCOUNTER — NON-APPOINTMENT (OUTPATIENT)
Age: 32
End: 2022-08-18

## 2022-08-18 VITALS
OXYGEN SATURATION: 99 % | WEIGHT: 192.5 LBS | BODY MASS INDEX: 34.11 KG/M2 | HEIGHT: 63 IN | SYSTOLIC BLOOD PRESSURE: 123 MMHG | DIASTOLIC BLOOD PRESSURE: 80 MMHG | HEART RATE: 87 BPM | TEMPERATURE: 97.2 F

## 2022-08-18 DIAGNOSIS — R07.89 OTHER CHEST PAIN: ICD-10-CM

## 2022-08-18 PROCEDURE — 99214 OFFICE O/P EST MOD 30 MIN: CPT | Mod: 25

## 2022-08-18 PROCEDURE — 93000 ELECTROCARDIOGRAM COMPLETE: CPT

## 2022-08-18 NOTE — REASON FOR VISIT
[FreeTextEntry1] : 32 year old woman with h/o ectopic pregnancy, migraines and IBS presents with chest pressure and dyspnea. Patient awoke yesterday morning with dull substernal chest pressure, associated with sensation of difficulty taking in a full breath. Pressure occurred at rest with no radiation though felt tightness in shoulders bilaterally. Chest pain was constant, not changed with exertion but associated with dyspnea, however aggravated by leaning forward today in office. Also increased dyspnea on exertion and lethargy. \par \par Over the past month, patient has had increased frequency of migraine headaches as well as diarrhea. On no mediations for migraine or IBS. She is under emotional stress due to her great grandmother who is very ill in hospital, and became tearful in office when discussing this. She had Covid 2 months ago not requiring hospitalization with similar symptoms of chest pressure and dyspnea however had interval resolution before this current episode. No recent fevers, chills, URI symptoms, or weight changes. 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 Denies palpitations, syncope/presyncope, orthopnea or PND.\par \par SH\par No cigarettes or ecigs, smokes hookah 2 to 3/month\par No illicits\par EtOH 4 glass of wine 2 to 4 times a month\par Works for [employer]\par \par PSx\par s/p cholecystectomy\par \par FH\par Great grandmother has PPM\par No h/o MI/SCD\par \par

## 2022-08-18 NOTE — PHYSICAL EXAM
[No Acute Distress] : no acute distress [Normal Venous Pressure] : normal venous pressure [Normal S1, S2] : normal S1, S2 [No Murmur] : no murmur [No Rub] : no rub [No Gallop] : no gallop [Clear Lung Fields] : clear lung fields [Good Air Entry] : good air entry [Soft] : abdomen soft [Non Tender] : non-tender [No Edema] : no edema [de-identified] : Chest pain not reproducible with palpation, aggravated by bending forward

## 2022-08-18 NOTE — ASSESSMENT
[FreeTextEntry1] : 32 year old woman with h/o ectopic pregnancy, migraines and IBS presents with atypical chest pressure and dyspnea x 1 day. ED labs and CXR reviewed wnl. EKG reviewed, NSR. Dimer negative. No known recent infection, patient had similar chest discomfort with Covid 2 months ago. Will evaluate for pericarditis given positional aggravation, low suspicion for ischemia will obtain labs for risk stratification and TTE to exclude structural causes. \par - Check A1c, lipids, CRP, TSH\par - TTE \par - Return precautions discussed, if chest pain worsens, syncope, dyspnea not improved with rest\par - Counseled mindfulness and breathing techniques, counseled on stress management including exercise and encouraged mental health care\par

## 2022-08-19 LAB
CHOLEST SERPL-MCNC: 231 MG/DL
CRP SERPL-MCNC: 4 MG/L
ESTIMATED AVERAGE GLUCOSE: 117 MG/DL
HBA1C MFR BLD HPLC: 5.7 %
HDLC SERPL-MCNC: 39 MG/DL
LDLC SERPL CALC-MCNC: 135 MG/DL
NONHDLC SERPL-MCNC: 192 MG/DL
TRIGL SERPL-MCNC: 286 MG/DL
TSH SERPL-ACNC: 1.28 UIU/ML

## 2022-08-22 NOTE — ED PROVIDER NOTE - HIV OFFER
Encounter addended by: Sue Diallo on: 8/22/2022 5:44 PM   Actions taken: Clinical Note Signed, Charge Capture section accepted
Opt out

## 2022-09-21 ENCOUNTER — EMERGENCY (EMERGENCY)
Facility: HOSPITAL | Age: 32
LOS: 1 days | Discharge: HOME | End: 2022-09-21
Attending: EMERGENCY MEDICINE | Admitting: EMERGENCY MEDICINE

## 2022-09-21 VITALS
OXYGEN SATURATION: 97 % | TEMPERATURE: 99 F | WEIGHT: 184.97 LBS | HEIGHT: 63 IN | DIASTOLIC BLOOD PRESSURE: 78 MMHG | HEART RATE: 68 BPM | RESPIRATION RATE: 18 BRPM | SYSTOLIC BLOOD PRESSURE: 115 MMHG

## 2022-09-21 DIAGNOSIS — R10.31 RIGHT LOWER QUADRANT PAIN: ICD-10-CM

## 2022-09-21 DIAGNOSIS — N83.201 UNSPECIFIED OVARIAN CYST, RIGHT SIDE: ICD-10-CM

## 2022-09-21 LAB
ALBUMIN SERPL ELPH-MCNC: 4 G/DL — SIGNIFICANT CHANGE UP (ref 3.4–5)
ALP SERPL-CCNC: 51 U/L — SIGNIFICANT CHANGE UP (ref 40–120)
ALT FLD-CCNC: 60 U/L — HIGH (ref 12–42)
ANION GAP SERPL CALC-SCNC: 8 MMOL/L — LOW (ref 9–16)
APPEARANCE UR: CLEAR — SIGNIFICANT CHANGE UP
APTT BLD: 30.1 SEC — SIGNIFICANT CHANGE UP (ref 27.5–35.5)
AST SERPL-CCNC: 21 U/L — SIGNIFICANT CHANGE UP (ref 15–37)
BILIRUB SERPL-MCNC: 0.3 MG/DL — SIGNIFICANT CHANGE UP (ref 0.2–1.2)
BILIRUB UR-MCNC: NEGATIVE — SIGNIFICANT CHANGE UP
BUN SERPL-MCNC: 11 MG/DL — SIGNIFICANT CHANGE UP (ref 7–23)
CALCIUM SERPL-MCNC: 9.3 MG/DL — SIGNIFICANT CHANGE UP (ref 8.5–10.5)
CHLORIDE SERPL-SCNC: 101 MMOL/L — SIGNIFICANT CHANGE UP (ref 96–108)
CO2 SERPL-SCNC: 26 MMOL/L — SIGNIFICANT CHANGE UP (ref 22–31)
COLOR SPEC: YELLOW — SIGNIFICANT CHANGE UP
CREAT SERPL-MCNC: 0.79 MG/DL — SIGNIFICANT CHANGE UP (ref 0.5–1.3)
DIFF PNL FLD: NEGATIVE — SIGNIFICANT CHANGE UP
EGFR: 103 ML/MIN/1.73M2 — SIGNIFICANT CHANGE UP
GLUCOSE SERPL-MCNC: 101 MG/DL — HIGH (ref 70–99)
GLUCOSE UR QL: NEGATIVE — SIGNIFICANT CHANGE UP
HCG SERPL-ACNC: 1 MIU/ML — SIGNIFICANT CHANGE UP
HCG UR QL: NEGATIVE — SIGNIFICANT CHANGE UP
HCT VFR BLD CALC: 39.7 % — SIGNIFICANT CHANGE UP (ref 34.5–45)
HGB BLD-MCNC: 13.4 G/DL — SIGNIFICANT CHANGE UP (ref 11.5–15.5)
INR BLD: 1.02 — SIGNIFICANT CHANGE UP (ref 0.88–1.16)
KETONES UR-MCNC: NEGATIVE — SIGNIFICANT CHANGE UP
LEUKOCYTE ESTERASE UR-ACNC: NEGATIVE — SIGNIFICANT CHANGE UP
LIDOCAIN IGE QN: 74 U/L — SIGNIFICANT CHANGE UP (ref 73–393)
MCHC RBC-ENTMCNC: 29.6 PG — SIGNIFICANT CHANGE UP (ref 27–34)
MCHC RBC-ENTMCNC: 33.8 GM/DL — SIGNIFICANT CHANGE UP (ref 32–36)
MCV RBC AUTO: 87.6 FL — SIGNIFICANT CHANGE UP (ref 80–100)
NITRITE UR-MCNC: NEGATIVE — SIGNIFICANT CHANGE UP
NRBC # BLD: 0 /100 WBCS — SIGNIFICANT CHANGE UP (ref 0–0)
PH UR: 7.5 — SIGNIFICANT CHANGE UP (ref 5–8)
PLATELET # BLD AUTO: 273 K/UL — SIGNIFICANT CHANGE UP (ref 150–400)
POTASSIUM SERPL-MCNC: 3.7 MMOL/L — SIGNIFICANT CHANGE UP (ref 3.5–5.3)
POTASSIUM SERPL-SCNC: 3.7 MMOL/L — SIGNIFICANT CHANGE UP (ref 3.5–5.3)
PROT SERPL-MCNC: 7.5 G/DL — SIGNIFICANT CHANGE UP (ref 6.4–8.2)
PROT UR-MCNC: NEGATIVE MG/DL — SIGNIFICANT CHANGE UP
PROTHROM AB SERPL-ACNC: 11.8 SEC — SIGNIFICANT CHANGE UP (ref 10.5–13.4)
RBC # BLD: 4.53 M/UL — SIGNIFICANT CHANGE UP (ref 3.8–5.2)
RBC # FLD: 13.1 % — SIGNIFICANT CHANGE UP (ref 10.3–14.5)
SODIUM SERPL-SCNC: 135 MMOL/L — SIGNIFICANT CHANGE UP (ref 132–145)
SP GR SPEC: 1.01 — SIGNIFICANT CHANGE UP (ref 1–1.03)
UROBILINOGEN FLD QL: 1 E.U./DL — SIGNIFICANT CHANGE UP
WBC # BLD: 7.09 K/UL — SIGNIFICANT CHANGE UP (ref 3.8–10.5)
WBC # FLD AUTO: 7.09 K/UL — SIGNIFICANT CHANGE UP (ref 3.8–10.5)

## 2022-09-21 PROCEDURE — 99285 EMERGENCY DEPT VISIT HI MDM: CPT

## 2022-09-21 PROCEDURE — 74177 CT ABD & PELVIS W/CONTRAST: CPT | Mod: 26

## 2022-09-21 RX ORDER — IBUPROFEN 200 MG
1 TABLET ORAL
Qty: 30 | Refills: 0
Start: 2022-09-21

## 2022-09-21 RX ORDER — ONDANSETRON 8 MG/1
4 TABLET, FILM COATED ORAL ONCE
Refills: 0 | Status: COMPLETED | OUTPATIENT
Start: 2022-09-21 | End: 2022-09-21

## 2022-09-21 RX ORDER — KETOROLAC TROMETHAMINE 30 MG/ML
15 SYRINGE (ML) INJECTION ONCE
Refills: 0 | Status: DISCONTINUED | OUTPATIENT
Start: 2022-09-21 | End: 2022-09-21

## 2022-09-21 RX ORDER — DIATRIZOATE MEGLUMINE 180 MG/ML
30 INJECTION, SOLUTION INTRAVESICAL ONCE
Refills: 0 | Status: COMPLETED | OUTPATIENT
Start: 2022-09-21 | End: 2022-09-21

## 2022-09-21 RX ORDER — IOHEXOL 300 MG/ML
30 INJECTION, SOLUTION INTRAVENOUS ONCE
Refills: 0 | Status: DISCONTINUED | OUTPATIENT
Start: 2022-09-21 | End: 2022-09-21

## 2022-09-21 RX ADMIN — ONDANSETRON 4 MILLIGRAM(S): 8 TABLET, FILM COATED ORAL at 21:00

## 2022-09-21 RX ADMIN — Medication 15 MILLIGRAM(S): at 21:00

## 2022-09-21 RX ADMIN — DIATRIZOATE MEGLUMINE 30 MILLILITER(S): 180 INJECTION, SOLUTION INTRAVESICAL at 20:02

## 2022-09-21 RX ADMIN — Medication 15 MILLIGRAM(S): at 23:10

## 2022-09-21 NOTE — ED PROVIDER NOTE - CLINICAL SUMMARY MEDICAL DECISION MAKING FREE TEXT BOX
32-year-old female with right lower quadrant abdominal pain.  Some concern for appendicitis versus musculoskeletal pain.  Plan for labs, pain meds, CT, reassess

## 2022-09-21 NOTE — ED PROVIDER NOTE - PROGRESS NOTE DETAILS
Labs within normal limits  UA without UTI  CT without evidence of appendicitis, right complex ovarian cyst  Patient's pain improved after medication.  When patient has pain is mild and dull and not consistent with the clinical picture of ovarian torsion.  Will follow-up outpatient with OB/GYN.  Discussed indication for patient return to ED.  Patient understood.

## 2022-09-21 NOTE — ED ADULT NURSE NOTE - MODE OF DISCHARGE
Chronic condition.  A1c recently at 6.1.  Takes 0.25 mg of Ozempic weekly.  Glucose levels at 100.   Ambulatory

## 2022-09-21 NOTE — ED PROVIDER NOTE - PHYSICAL EXAMINATION
GENERAL: well appearing, no acute distress   HEAD: atraumatic   EYES: EOMI   ENT: moist oral mucosa   CARDIAC: regular rate  RESPIRATORY: no increased work of breathing   ABDO: soft, RLQ ttp w/o rebound or guarding  MUSCULOSKELETAL: no deformity   NEUROLOGICAL: alert, spontaneous movement of extremities   SKIN: no visible rash  PSYCHIATRIC: cooperative

## 2022-09-21 NOTE — ED ADULT TRIAGE NOTE - CHIEF COMPLAINT QUOTE
c/o RLQ pain radiating to R flank and R leg, worsening with movement. denies N/V/D. +appears comfortable

## 2022-09-21 NOTE — ED PROVIDER NOTE - PATIENT PORTAL LINK FT
You can access the FollowMyHealth Patient Portal offered by [facility] by registering at the following website: [email or web address]. By joining [employer]’s FollowMyHealth portal, you will also be able to view your health information using other applications (apps) compatible with our system. You can access the FollowMyHealth Patient Portal offered by [facility] by registering at the following website: [email or web address]. By joining [employer]’s FollowMyHealth portal, you will also be able to view your health information using other applications (apps) compatible with our system. You can access the FollowMyHealth Patient Portal offered by [facility] by registering at the following website: [email or web address]. By joining [employer]’s FollowMyHealth portal, you will also be able to view your health information using other applications (apps) compatible with our system.

## 2022-09-21 NOTE — ED PROVIDER NOTE - OBJECTIVE STATEMENT
32-year-old female denies past medical history complains of right lower quadrant abdominal pain x1 day, first noticed upon waking this morning, constant, nonradiating, worse with walking upstairs and with palpation.  LMP September 4.  Denies fever, nausea vomiting, vaginal bleeding or discharge, diarrhea or constipation, other acute complaints.

## 2022-09-26 ENCOUNTER — APPOINTMENT (OUTPATIENT)
Dept: HEART AND VASCULAR | Facility: CLINIC | Age: 32
End: 2022-09-26

## 2022-09-26 VITALS
SYSTOLIC BLOOD PRESSURE: 115 MMHG | TEMPERATURE: 98 F | HEART RATE: 71 BPM | HEIGHT: 63 IN | WEIGHT: 185 LBS | DIASTOLIC BLOOD PRESSURE: 80 MMHG | OXYGEN SATURATION: 97 % | BODY MASS INDEX: 32.78 KG/M2

## 2022-09-26 PROCEDURE — 99214 OFFICE O/P EST MOD 30 MIN: CPT

## 2022-09-28 ENCOUNTER — OUTPATIENT (OUTPATIENT)
Dept: OUTPATIENT SERVICES | Facility: HOSPITAL | Age: 32
LOS: 1 days | End: 2022-09-28
Payer: MEDICAID

## 2022-09-28 ENCOUNTER — APPOINTMENT (OUTPATIENT)
Dept: OBGYN | Facility: CLINIC | Age: 32
End: 2022-09-28

## 2022-09-28 VITALS — WEIGHT: 193 LBS | SYSTOLIC BLOOD PRESSURE: 112 MMHG | BODY MASS INDEX: 34.19 KG/M2 | DIASTOLIC BLOOD PRESSURE: 74 MMHG

## 2022-09-28 DIAGNOSIS — N76.0 ACUTE VAGINITIS: ICD-10-CM

## 2022-09-28 DIAGNOSIS — Z98.89 OTHER SPECIFIED POSTPROCEDURAL STATES: Chronic | ICD-10-CM

## 2022-09-28 DIAGNOSIS — Z98.890 OTHER SPECIFIED POSTPROCEDURAL STATES: ICD-10-CM

## 2022-09-28 DIAGNOSIS — Z87.59 PERSONAL HISTORY OF OTHER COMPLICATIONS OF PREGNANCY, CHILDBIRTH AND THE PUERPERIUM: ICD-10-CM

## 2022-09-28 DIAGNOSIS — N83.209 UNSPECIFIED OVARIAN CYST, UNSPECIFIED SIDE: ICD-10-CM

## 2022-09-28 DIAGNOSIS — O36.80X0 PREGNANCY WITH INCONCLUSIVE FETAL VIABILITY, NOT APPLICABLE OR UNSPECIFIED: ICD-10-CM

## 2022-09-28 PROCEDURE — G0463: CPT

## 2022-09-28 PROCEDURE — 99213 OFFICE O/P EST LOW 20 MIN: CPT | Mod: GC

## 2022-09-28 RX ORDER — NORETHINDRONE ACETATE AND ETHINYL ESTRADIOL 1; 20 MG/1; UG/1
1-20 TABLET ORAL
Qty: 1 | Refills: 4 | Status: ACTIVE | COMMUNITY
Start: 2022-09-28 | End: 1900-01-01

## 2022-09-29 NOTE — DISCUSSION/SUMMARY
[FreeTextEntry1] : 31 yo  LMP 22 presenting for follow up ED visit, found to have a R ovarian cyst 3.6x3.3cm.\par \par #Ovarian cyst\par - Likely physiologic ovarian cyst. \par - Physical exam deferred due to risk of cyst rupture. Physical exam would not change our management.\par - Pt counseled that physiologic ovarian cyst will resolve spontaneously \par - Tylenol and Motrin for pain\par - OCP for prevention of recurrence\par - Follow up ultrasound in 3 months to monitor ovarian cyst\par \par Kusum Estrada, PGY1\par d/w Dr. Salinas

## 2022-09-29 NOTE — HISTORY OF PRESENT ILLNESS
[Patient reported PAP Smear was normal] : Patient reported PAP Smear was normal [Currently Active] : currently active [Condoms] : Condoms [PapSmeardate] : 06/21 [TextBox_31] : s/p GRICELDA 2019 [LMPDate] : 09/04/22 [PGHxTotal] : 3 [PGHxAbortions] : 3 [PGHxABInduced] : 1 [PGHxABSpont] : 1 [PGxEctopic] : 1 [FreeTextEntry1] : GYN: + R ovarian cyst, +herpes [FreeTextEntry2] : 1 partner [FreeTextEntry3] : inconsistent use

## 2022-09-29 NOTE — PHYSICAL EXAM
[Appropriately responsive] : appropriately responsive [Alert] : alert [No Acute Distress] : no acute distress [Oriented x3] : oriented x3 [FreeTextEntry8] : Pelvic exam deferred due to risk of cyst rupture

## 2022-10-05 DIAGNOSIS — N83.209 UNSPECIFIED OVARIAN CYST, UNSPECIFIED SIDE: ICD-10-CM

## 2022-10-20 ENCOUNTER — OUTPATIENT (OUTPATIENT)
Dept: OUTPATIENT SERVICES | Facility: HOSPITAL | Age: 32
LOS: 1 days | End: 2022-10-20

## 2022-10-20 DIAGNOSIS — R07.89 OTHER CHEST PAIN: ICD-10-CM

## 2022-11-03 ENCOUNTER — APPOINTMENT (OUTPATIENT)
Dept: HEART AND VASCULAR | Facility: CLINIC | Age: 32
End: 2022-11-03

## 2022-11-10 ENCOUNTER — APPOINTMENT (OUTPATIENT)
Dept: HEART AND VASCULAR | Facility: CLINIC | Age: 32
End: 2022-11-10

## 2022-11-11 ENCOUNTER — APPOINTMENT (OUTPATIENT)
Dept: HEART AND VASCULAR | Facility: CLINIC | Age: 32
End: 2022-11-11

## 2023-01-02 NOTE — ED PROVIDER NOTE - GASTROINTESTINAL [+], MLM
Consent was obtained from the patient. The risks, benefits and alternatives to therapy were discussed in detail. Specifically, the risks of infection, scarring, bleeding, prolonged wound healing, nerve injury, incomplete removal, allergy to anesthesia and recurrence were addressed. Alternatives to ED&C, such as: surgical removal and XRT were also discussed.  Prior to the procedure, the treatment site was clearly identified and confirmed by the patient. All components of Universal Protocol/PAUSE Rule completed. ABDOMINAL PAIN/vaginal bleeding

## 2023-02-21 ENCOUNTER — EMERGENCY (EMERGENCY)
Facility: HOSPITAL | Age: 33
LOS: 1 days | Discharge: HOME | End: 2023-02-21
Admitting: EMERGENCY MEDICINE
Payer: MEDICAID

## 2023-02-21 VITALS
SYSTOLIC BLOOD PRESSURE: 114 MMHG | RESPIRATION RATE: 18 BRPM | HEART RATE: 75 BPM | WEIGHT: 184.97 LBS | TEMPERATURE: 99 F | OXYGEN SATURATION: 97 % | DIASTOLIC BLOOD PRESSURE: 78 MMHG

## 2023-02-21 DIAGNOSIS — Z98.89 OTHER SPECIFIED POSTPROCEDURAL STATES: Chronic | ICD-10-CM

## 2023-02-21 LAB
ALBUMIN SERPL ELPH-MCNC: 4.1 G/DL — SIGNIFICANT CHANGE UP (ref 3.4–5)
ALP SERPL-CCNC: 42 U/L — SIGNIFICANT CHANGE UP (ref 40–120)
ALT FLD-CCNC: 50 U/L — HIGH (ref 12–42)
ANION GAP SERPL CALC-SCNC: 8 MMOL/L — LOW (ref 9–16)
AST SERPL-CCNC: 24 U/L — SIGNIFICANT CHANGE UP (ref 15–37)
BASOPHILS # BLD AUTO: 0.03 K/UL — SIGNIFICANT CHANGE UP (ref 0–0.2)
BASOPHILS NFR BLD AUTO: 0.4 % — SIGNIFICANT CHANGE UP (ref 0–2)
BILIRUB SERPL-MCNC: 0.4 MG/DL — SIGNIFICANT CHANGE UP (ref 0.2–1.2)
BUN SERPL-MCNC: 13 MG/DL — SIGNIFICANT CHANGE UP (ref 7–23)
CALCIUM SERPL-MCNC: 9.5 MG/DL — SIGNIFICANT CHANGE UP (ref 8.5–10.5)
CHLORIDE SERPL-SCNC: 100 MMOL/L — SIGNIFICANT CHANGE UP (ref 96–108)
CO2 SERPL-SCNC: 27 MMOL/L — SIGNIFICANT CHANGE UP (ref 22–31)
CREAT SERPL-MCNC: 0.84 MG/DL — SIGNIFICANT CHANGE UP (ref 0.5–1.3)
EGFR: 96 ML/MIN/1.73M2 — SIGNIFICANT CHANGE UP
EOSINOPHIL # BLD AUTO: 0.1 K/UL — SIGNIFICANT CHANGE UP (ref 0–0.5)
EOSINOPHIL NFR BLD AUTO: 1.3 % — SIGNIFICANT CHANGE UP (ref 0–6)
GLUCOSE SERPL-MCNC: 90 MG/DL — SIGNIFICANT CHANGE UP (ref 70–99)
HCG SERPL-ACNC: <1 MIU/ML — SIGNIFICANT CHANGE UP
HCG UR QL: NEGATIVE — SIGNIFICANT CHANGE UP
HCT VFR BLD CALC: 39.8 % — SIGNIFICANT CHANGE UP (ref 34.5–45)
HGB BLD-MCNC: 13.6 G/DL — SIGNIFICANT CHANGE UP (ref 11.5–15.5)
IMM GRANULOCYTES NFR BLD AUTO: 0.3 % — SIGNIFICANT CHANGE UP (ref 0–0.9)
LYMPHOCYTES # BLD AUTO: 3.15 K/UL — SIGNIFICANT CHANGE UP (ref 1–3.3)
LYMPHOCYTES # BLD AUTO: 40.7 % — SIGNIFICANT CHANGE UP (ref 13–44)
MCHC RBC-ENTMCNC: 29.4 PG — SIGNIFICANT CHANGE UP (ref 27–34)
MCHC RBC-ENTMCNC: 34.2 GM/DL — SIGNIFICANT CHANGE UP (ref 32–36)
MCV RBC AUTO: 86.1 FL — SIGNIFICANT CHANGE UP (ref 80–100)
MONOCYTES # BLD AUTO: 0.37 K/UL — SIGNIFICANT CHANGE UP (ref 0–0.9)
MONOCYTES NFR BLD AUTO: 4.8 % — SIGNIFICANT CHANGE UP (ref 2–14)
NEUTROPHILS # BLD AUTO: 4.07 K/UL — SIGNIFICANT CHANGE UP (ref 1.8–7.4)
NEUTROPHILS NFR BLD AUTO: 52.5 % — SIGNIFICANT CHANGE UP (ref 43–77)
NRBC # BLD: 0 /100 WBCS — SIGNIFICANT CHANGE UP (ref 0–0)
PLATELET # BLD AUTO: 277 K/UL — SIGNIFICANT CHANGE UP (ref 150–400)
POTASSIUM SERPL-MCNC: 3.6 MMOL/L — SIGNIFICANT CHANGE UP (ref 3.5–5.3)
POTASSIUM SERPL-SCNC: 3.6 MMOL/L — SIGNIFICANT CHANGE UP (ref 3.5–5.3)
PROT SERPL-MCNC: 8 G/DL — SIGNIFICANT CHANGE UP (ref 6.4–8.2)
RBC # BLD: 4.62 M/UL — SIGNIFICANT CHANGE UP (ref 3.8–5.2)
RBC # FLD: 12.7 % — SIGNIFICANT CHANGE UP (ref 10.3–14.5)
SODIUM SERPL-SCNC: 135 MMOL/L — SIGNIFICANT CHANGE UP (ref 132–145)
TROPONIN I, HIGH SENSITIVITY RESULT: <4 NG/L — SIGNIFICANT CHANGE UP
WBC # BLD: 7.74 K/UL — SIGNIFICANT CHANGE UP (ref 3.8–10.5)
WBC # FLD AUTO: 7.74 K/UL — SIGNIFICANT CHANGE UP (ref 3.8–10.5)

## 2023-02-21 PROCEDURE — 99285 EMERGENCY DEPT VISIT HI MDM: CPT

## 2023-02-21 PROCEDURE — 71046 X-RAY EXAM CHEST 2 VIEWS: CPT | Mod: 26

## 2023-02-21 NOTE — ED PROVIDER NOTE - DISCHARGE DATE
No retinal tears or retinal detachment seen on clinical exam today. Reviewed the signs and symptoms of retinal tear/retinal detachment and the importance of calling for prompt evaluation should there be increasing floaters, new flashing lights, or decreasing peripheral vision in either eye at any time. Observation recommended. 21-Feb-2023

## 2023-02-21 NOTE — ED PROVIDER NOTE - OBJECTIVE STATEMENT
31 yo f pmhx sig for prediabetes pw several months of episodic chest pain sharp left sided acute onset and self resolving, pt reports latest episode occurred 12 hr pta and radiating down the L arm w/o n/v. The episodes are non exertional non positional w/o provoking or modifying factors, no cough or fevers or chills. No unilateral swelling, hemoptysis, estogen supplementation, malignancy, recent immobilization or surgery, or prior DVT/PE.    I have reviewed available current nursing and previous documentation of past medical, surgical, family, and/or social history.

## 2023-02-21 NOTE — ED ADULT NURSE NOTE - NSIMPLEMENTINTERV_GEN_ALL_ED
Implemented All Universal Safety Interventions:  [place] to call system. Call bell, personal items and telephone within reach. Instruct patient to call for assistance. Room bathroom lighting operational. Non-slip footwear when patient is off stretcher. Physically safe environment: no spills, clutter or unnecessary equipment. Stretcher in lowest position, wheels locked, appropriate side rails in place. Implemented All Universal Safety Interventions:  [place] to call system. Call bell, personal items and telephone within reach. Instruct patient to call for assistance. Room bathroom lighting operational. Non-slip footwear when patient is off stretcher. Physically safe environment: no spills, clutter or unnecessary equipment. Stretcher in lowest position, wheels locked, appropriate side rails in place. Implemented All Universal Safety Interventions:  [place] to call system. Call bell, personal items and telephone within reach. Instruct patient to call for assistance. Room bathroom lighting operational. Non-slip footwear when patient is off stretcher. Physically safe environment: no spills, clutter or unnecessary equipment. Stretcher in lowest position, wheels locked, appropriate side rails in place.

## 2023-02-21 NOTE — ED PROVIDER NOTE - PHYSICAL EXAMINATION
Physical Exam    Vital Signs: I have reviewed the initial vital signs.  Constitutional: well-nourished, appears stated age  Eyes: PERRLA, and symmetrical lids.  ENT: Neck supple with no adenopathy, moist MM.  Cardiovascular: regular rate, regular rhythm, well-perfused extremities  Respiratory: unlabored respiratory effort, clear to auscultation bilaterally  Gastrointestinal: soft, non-tender abdomen, no pulsatile mass  Musculoskeletal: supple neck, no lower extremity edema  Integumentary: warm, dry, no rash  Neurologic: awake, alert, cranial nerves II-XII grossly intact, extremities’ motor and sensory functions grossly intact  Psychiatric: A&Ox3, appropriate mood, appropriate affect

## 2023-02-21 NOTE — ED PROVIDER NOTE - CLINICAL SUMMARY MEDICAL DECISION MAKING FREE TEXT BOX
pt well appearing here with episodic chest pain sharp left sided radiating ann the l arm, the cp was atypical non exertional non positional, will r/o acs, pe, pnx, and ptx; plan: cbc, cmp, trop, ecg, cxr

## 2023-02-21 NOTE — ED PROVIDER NOTE - PATIENT PORTAL LINK FT
You can access the FollowMyHealth Patient Portal offered by [facility] by registering at the following website: [email or web address]. By joining [employer]s FollowMyHealth portal, you will also be able to view your health information using other applications (apps) compatible with our system. You can access the FollowMyHealth Patient Portal offered by [facility] by registering at the following website: [email or web address]. By joining [employer]’s FollowMyHealth portal, you will also be able to view your health information using other applications (apps) compatible with our system. You can access the FollowMyHealth Patient Portal offered by [facility] by registering at the following website: [email or web address]. By joining [facility]’s FollowMyHealth portal, you will also be able to view your health information using other applications (apps) compatible with our system.

## 2023-02-21 NOTE — ED PROVIDER NOTE - CARE PROVIDERS DIRECT ADDRESSES
[email or web address]

## 2023-02-21 NOTE — ED PROVIDER NOTE - CARE PROVIDER_API CALL
Lauri Shields)  Cardiovascular Disease  [address] [place] [address]  Phone: [phone]  Fax: [phone]  Follow Up Time: 1-3 Days   Lauri Shields)  Cardiovascular Disease  [address] [place] [address]  Phone: [phone]  Fax: [phone]  Follow Up Time: 1-3 Days   Lauri Shields)  Cardiovascular Disease  [address] [place] [address]  Phone: [phone]  Fax: [phone]  Follow Up Time: 1-3 Days

## 2023-02-21 NOTE — ED ADULT NURSE NOTE - OBJECTIVE STATEMENT
presents to ED c/o chest pain intermittently for a few weeks worsening today with radiation of heaviness/discomfort to L arm. Endorses constant in nature since today. Reports previously seeing cardiologist but unable to follow up with stress test and echocardiogram due to circumstances at that time. endorses SOB but unchanged from baseline, denies dizziness, headache, fever, chills, N/V/D.

## 2023-02-24 DIAGNOSIS — R07.89 OTHER CHEST PAIN: ICD-10-CM

## 2023-02-24 DIAGNOSIS — R73.03 PREDIABETES: ICD-10-CM

## 2023-02-24 DIAGNOSIS — Z20.822 CONTACT WITH AND (SUSPECTED) EXPOSURE TO COVID-19: ICD-10-CM

## 2023-03-06 NOTE — ED PROVIDER NOTE - ATTENDING APP SHARED VISIT CONTRIBUTION OF CARE
PT wound care eval/n/a Patient is a 33 yo F with no chronic medical problems, hx of cholecystectomy here for 2 weeks of abdominal pain. No fevers. + nausea. No vomiting. Patient went to another ED last week, had a pelvic ultrasound which showed a cyst.      INCOMPLETE NOTE Patient is a 33 yo F with no chronic medical problems, hx of cholecystectomy here for 2 weeks of abdominal pain. No fevers. + nausea. No vomiting. Patient went to another ED last week, had a pelvic ultrasound. Patient reports pain is underneath both breasts and is a constant sharp pain. She states she has had a constant pain there since having her gallbladder taken out but in the past 2 weeks it has been worse. She also has lower abdominal discomfort. She reports she usually has 7-8 diarrhea type of bowel movements a day after having her GB taken out but recently she only goes 1-2 times a day. LBM was yesterday, described as normal, formed. No fevers, chills. No shortness of breath. Denies urinary symptoms.     VS noted  Gen. no acute distress, Non toxic   HEENT: EOMI, mmm  Lungs: CTAB/L no C/ W /R   CVS: RRR   Abd; Soft, mildly ttp bilateral upper abdomen, no rebound or guarding  Ext: no edema  Skin: no rash  Neuro AAOx3 non focal clear speech  a/p: abdominal pain x 2 weeks, associated with nausea. Plan for labs, u/a, CT A/P and TVUS. Will give pain meds and reassess.   - Mckay BERMEO n/a

## 2023-03-12 NOTE — ED ADULT TRIAGE NOTE - AS HEIGHT TYPE
Patient called Gynecology pager asking for medication for post op nausea. Zofran sent to pharmacy.     Jillian Lora MD  [facility]   OBGYN PGY1     stated

## 2023-03-31 NOTE — ED PROVIDER NOTE - PROVIDER TOKENS
Last Read in Patient Portal   3/31/2023  1:16 PM by Alice Wheeler   PROVIDER:[TOKEN:[[id number]],FOLLOWUP:[1-3 Days]]

## 2023-05-23 NOTE — REASON FOR VISIT
Detail Level: Detailed [Follow-Up] : a follow-up evaluation of When Should The Patient Follow-Up For Their Next Full-Body Skin Exam?: 6 Months

## 2023-05-31 NOTE — ED ADULT TRIAGE NOTE - PATIENT ON (OXYGEN DELIVERY METHOD)
SUBJECTIVE:   CC: Lazaro is an 27 year old who presents for preventative health visit.       5/31/2023     9:30 AM   Additional Questions   Roomed by Elder AYALA   Accompanied by n/a         5/31/2023     9:30 AM   Patient Reported Additional Medications   Patient reports taking the following new medications n/a     Patient has been advised of split billing requirements and indicates understanding: Yes  Healthy Habits:    Getting at least 3 servings of Calcium per day:  Yes    Bi-annual eye exam:  NO    Dental care twice a year:  NO    Sleep apnea or symptoms of sleep apnea:  Daytime drowsiness, Excessive snoring and Sleep apnea    Diet:  Regular (no restrictions)    Frequency of exercise:  2-3 days/week    Duration of exercise:  30-45 minutes    Taking medications regularly:  Yes    Medication side effects:  Not applicable    PHQ-2 Total Score:    Additional concerns today:  No (needs his HPV vaccine)    Wishes to discuss erectile dysfunction.  Difficulty maintaining erections.  No new sexual partners.  No pain or trauma.    History of T lymphoblastic lymphoma followed with oncology.  Declines COVID vaccination.  Desires HPV vaccination.  Has received further vaccines from his oncology office with scheduled receive more next month.    Have you ever done Advance Care Planning? (For example, a Health Directive, POLST, or a discussion with a medical provider or your loved ones about your wishes): No, advance care planning information given to patient to review.  Patient plans to discuss their wishes with loved ones or provider.      Social History     Tobacco Use     Smoking status: Never     Passive exposure: Never     Smokeless tobacco: Never   Vaping Use     Vaping status: Every Day     Substances: Nicotine   Substance Use Topics     Alcohol use: Yes     Alcohol/week: 4.0 standard drinks of alcohol     Types: 4 Standard drinks or equivalent per week         5/31/2023     9:19 AM   Alcohol Use   Prescreen: >3 drinks/day  or >7 drinks/week? Yes   AUDIT SCORE  5         5/31/2023     9:19 AM   AUDIT - Alcohol Use Disorders Identification Test - Reproduced from the World Health Organization Audit 2001 (Second Edition)   1.  How often do you have a drink containing alcohol? 2 to 3 times a week   2.  How many drinks containing alcohol do you have on a typical day when you are drinking? 1 or 2   3.  How often do you have five or more drinks on one occasion? Monthly   4.  How often during the last year have you found that you were not able to stop drinking once you had started? Never   5.  How often during the last year have you failed to do what was normally expected of you because of drinking? Never   6.  How often during the last year have you needed a first drink in the morning to get yourself going after a heavy drinking session? Never   7.  How often during the last year have you had a feeling of guilt or remorse after drinking? Never   8.  How often during the last year have you been unable to remember what happened the night before because of your drinking? Never   9.  Have you or someone else been injured because of your drinking? No   10. Has a relative, friend, doctor or other health care worker been concerned about your drinking or suggested you cut down? No   TOTAL SCORE 5     Last PSA: No results found for: PSA    Reviewed orders with patient. 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)      Neuropathic pain     Insomnia due to medical condition     Encounter for smoking cessation counseling     Nicotine dependence, uncomplicated, unspecified nicotine product type     Hyperlipidemia LDL goal <160     Overweight     Past Surgical History:   Procedure Laterality Date     BONE MARROW BIOPSY, BONE SPECIMEN, NEEDLE/TROCAR Left 9/1/2019    Procedure: BIOPSY, BONE MARROW;  Surgeon: Heather Lopez MD;  [facility]     INSERT PICC LINE N/A 8/31/2019    Procedure: INSERTION, PICC;  Surgeon: Michell Keith MD;  [facility]     INSERT PORT VASCULAR ACCESS N/A 10/24/2019    Procedure: INSERTION, VASCULAR ACCESS PORT;  Surgeon: Silviano Martins MD;  [facility]      IR CHEST PORT PLACEMENT > 5 YRS OF AGE  10/24/2019     IR CHEST TUBE PLACEMENT NON-TUNNELLED LEFT  8/31/2019     IR PICC PLACEMENT > 5 YRS OF AGE  8/31/2019     IR PORT CHECK RIGHT  11/12/2019     IR PORT REMOVAL RIGHT  11/30/2021     REMOVE PORT VASCULAR ACCESS N/A 11/30/2021    Procedure: REMOVAL, VASCULAR ACCESS PORT;  Surgeon: Stacey Lim PA-C;  [facility]      SPINAL PUNCTURE,LUMBAR, INTRATHECAL CHEMO DELIVERY N/A 8/31/2019    Procedure: LUMBAR PUNCTURE, WITH INTRATHECAL CHEMOTHERAPY ADMINISTRATION;  Surgeon: Heather Lopez MD;  [facility]     SPINAL PUNCTURE,LUMBAR, INTRATHECAL CHEMO DELIVERY N/A 9/9/2019    Procedure: Lumbar Puncture With Intrathecal Chemo;  Surgeon: Alexi Hayes MD;  [facility]     SPINAL PUNCTURE,LUMBAR, INTRATHECAL CHEMO DELIVERY N/A 10/10/2019    Procedure: Lumbar puncture with IT Chemo (CD);  Surgeon: Reynaldo Campuzano MD;  [facility]      SPINAL PUNCTURE,LUMBAR, INTRATHECAL CHEMO DELIVERY N/A 10/17/2019    Procedure: Lumbar puncture with IT Chemo (not CD);  Surgeon: Reynaldo Campuzano MD;  [facility]      SPINAL PUNCTURE,LUMBAR, INTRATHECAL CHEMO DELIVERY N/A 10/24/2019    Procedure: LUMBAR  PUNCTURE, WITH INTRATHECAL CHEMOTHERAPY ADMINISTRATION;  Surgeon: Félix Van APRN CNP;  [facility]      SPINAL PUNCTURE,LUMBAR, INTRATHECAL CHEMO DELIVERY N/A 10/31/2019    Procedure: Lumbar puncture with IT Chemo (not CD);  Surgeon: Reynaldo Campuzano MD;  [facility]      SPINAL PUNCTURE,LUMBAR, INTRATHECAL CHEMO DELIVERY N/A 12/19/2019    Procedure: Lumbar puncture with IT Chem (CD);  Surgeon: Félix Van APRN CNP;  [facility]      SPINAL PUNCTURE,LUMBAR, INTRATHECAL CHEMO DELIVERY N/A 12/23/2019    Procedure: Lumbar puncture with IT Chem (CD);  Surgeon: Heather Lopez MD;  [facility]      SPINAL PUNCTURE,LUMBAR, INTRATHECAL CHEMO DELIVERY N/A 1/23/2020    Procedure: Lumbar puncture with IT Chem (CD);  Surgeon: Reynaldo Campuzano MD;  [facility]      SPINAL PUNCTURE,LUMBAR, INTRATHECAL CHEMO DELIVERY N/A 2/20/2020    Procedure: Lumbar puncture with intrathecal Chemotherapy (CD);  Surgeon: Reynaldo Campuzano MD;  [facility]      SPINAL PUNCTURE,LUMBAR, INTRATHECAL CHEMO DELIVERY N/A 3/19/2020    Procedure: Lumbar puncture with intrathecal Chemotherapy (CD);  Surgeon: Reynaldo Campuzano MD;  [facility]      SPINAL PUNCTURE,LUMBAR, INTRATHECAL CHEMO DELIVERY N/A 3/26/2020    Procedure: Lumbar puncture with intrathecal Chemotherapy (not CD);  Surgeon: Todd Mercado MD;  [facility]      SPINAL PUNCTURE,LUMBAR, INTRATHECAL CHEMO DELIVERY N/A 4/23/2020    Procedure: Lumbar puncture with intrathecal Chemotherapy (CD);  Surgeon: Marleny Brewer MD;  [facility]      SPINAL PUNCTURE,LUMBAR, INTRATHECAL CHEMO DELIVERY N/A 5/14/2020    Procedure: Lumbar puncture with intrathecal Chemotherapy (not CD);  Surgeon: Todd Mercado MD;  [facility]      SPINAL PUNCTURE,LUMBAR, INTRATHECAL CHEMO DELIVERY N/A  7/9/2020    Procedure: Lumbar puncture with intrathecal Chemotherapy (CD);  Surgeon: Todd Mercado MD;  [facility]      SPINAL PUNCTURE,LUMBAR, INTRATHECAL CHEMO DELIVERY N/A 8/6/2020    Procedure: Lumbar puncture with intrathecal Chemotherapy (not CD);  Surgeon: Todd Mercado MD;  [facility]      SPINAL PUNCTURE,LUMBAR, INTRATHECAL CHEMO DELIVERY N/A 10/6/2020    Procedure: Lumbar puncture with intrathecal Chemotherapy (not CD);  Surgeon: Félix Van APRN CNP;  [facility]      SPINAL PUNCTURE,LUMBAR, INTRATHECAL CHEMO DELIVERY N/A 11/3/2020    Procedure: Lumbar puncture with intrathecal Chemotherapy (not CD);  Surgeon: Reynaldo Campuzano MD;  [facility]      SPINAL PUNCTURE,LUMBAR, INTRATHECAL CHEMO DELIVERY N/A 12/29/2020    Procedure: Lumbar puncture with intrathecal Chemotherapy (CD);  Surgeon: Reynaldo Campuzano MD;  [facility]      SPINAL PUNCTURE,LUMBAR, INTRATHECAL CHEMO DELIVERY N/A 2/23/2021    Procedure: Lumbar puncture with intrathecal Chemotherapy (not CD);  Surgeon: Félix Van APRN CNP;  [facility]      SPINAL PUNCTURE,LUMBAR, INTRATHECAL CHEMO DELIVERY N/A 3/23/2021    Procedure: Lumbar puncture with intrathecal Chemotherapy (not CD);  Surgeon: Marie Damon NP;  [facility]      SPINAL PUNCTURE,LUMBAR, INTRATHECAL CHEMO DELIVERY N/A 4/20/2021    Procedure: Lumbar puncture with intrathecal Chemotherapy (not CD);  Surgeon: Marie Damon NP;  [facility]      SPINAL PUNCTURE,LUMBAR, INTRATHECAL CHEMO DELIVERY N/A 6/15/2021    Procedure: Lumbar puncture with intrathecal Chemotherapy (not CD);  Surgeon: Félix Van APRN CNP;  [facility]      SPINAL PUNCTURE,LUMBAR, INTRATHECAL CHEMO DELIVERY N/A 9/7/2021    Procedure: Lumbar puncture with intrathecal Chemotherapy (not CD);  Surgeon: Reynaldo Campuzano  MD;  [facility]      SPINAL PUNCTURE,LUMBAR, INTRATHECAL CHEMO DELIVERY N/A 11/30/2021    Procedure: Lumbar puncture with intrathecal Chemotherapy (not CD);  Surgeon: Félix Van APRN CNP;  [facility]      THORACENTESIS N/A 8/31/2019    Procedure: Thoracentesis;  Surgeon: Michell Keith MD;  [facility]       Social History     Tobacco Use     Smoking status: Never     Passive exposure: Never     Smokeless tobacco: Never   Vaping Use     Vaping status: Every Day     Substances: Nicotine   Substance Use Topics     Alcohol use: Yes     Alcohol/week: 4.0 standard drinks of alcohol     Types: 4 Standard drinks or equivalent per week     Family History   Problem Relation Age of Onset     No Known Problems Mother      No Known Problems Father      Asthma Brother      Thyroid Cancer Paternal Grandmother      Melanoma Paternal Aunt          Current Outpatient Medications   Medication Sig Dispense Refill     tadalafil (CIALIS) 5 MG tablet Take 1-2 tablets (5-10 mg) by mouth daily 30 tablet 3     Allergies   Allergen Reactions     Asparaginase Derivatives Other (See Comments)     Severe pancreatitis     No Known Drug Allergy      Reviewed and updated as needed this visit by clinical staff   Tobacco  Allergies  Meds  Problems  Med Hx  Surg Hx  Fam Hx          Reviewed and updated as needed this visit by Provider   Tobacco  Allergies  Meds  Problems  Med Hx  Surg Hx  Fam Hx         Past Medical History:   Diagnosis Date     Acute necrotizing pancreatitis 11/07/2019    attributed to asparaginase     Acute pancreatitis due to PEGaspariginase therapy  11/15/2019     DVT of upper extremity (deep vein thrombosis) (H) 09/26/2019    Bilateral      Edema of upper extremity 10/17/2019     Folliculitis 10/17/2019     Migraine 2006    have resolved     T lymphoblastic lymphoma (H) 08/30/2019      Past Surgical History:   Procedure Laterality Date     BONE MARROW BIOPSY, BONE  SPECIMEN, NEEDLE/TROCAR Left 9/1/2019    Procedure: BIOPSY, BONE MARROW;  Surgeon: Heather Lopez MD;  [facility]     INSERT PICC LINE N/A 8/31/2019    Procedure: INSERTION, PICC;  Surgeon: Michell Keith MD;  [facility]     INSERT PORT VASCULAR ACCESS N/A 10/24/2019    Procedure: INSERTION, VASCULAR ACCESS PORT;  Surgeon: Silviano Martins MD;  [facility]      IR CHEST PORT PLACEMENT > 5 YRS OF AGE  10/24/2019     IR CHEST TUBE PLACEMENT NON-TUNNELLED LEFT  8/31/2019     IR PICC PLACEMENT > 5 YRS OF AGE  8/31/2019     IR PORT CHECK RIGHT  11/12/2019     IR PORT REMOVAL RIGHT  11/30/2021     REMOVE PORT VASCULAR ACCESS N/A 11/30/2021    Procedure: REMOVAL, VASCULAR ACCESS PORT;  Surgeon: Stacey Lim PA-C;  [facility]      SPINAL PUNCTURE,LUMBAR, INTRATHECAL CHEMO DELIVERY N/A 8/31/2019    Procedure: LUMBAR PUNCTURE, WITH INTRATHECAL CHEMOTHERAPY ADMINISTRATION;  Surgeon: Heather Lopez MD;  [facility]     SPINAL PUNCTURE,LUMBAR, INTRATHECAL CHEMO DELIVERY N/A 9/9/2019    Procedure: Lumbar Puncture With Intrathecal Chemo;  Surgeon: Alexi Hayes MD;  [facility]     SPINAL PUNCTURE,LUMBAR, INTRATHECAL CHEMO DELIVERY N/A 10/10/2019    Procedure: Lumbar puncture with IT Chemo (CD);  Surgeon: Reynaldo Campuzano MD;  [facility]      SPINAL PUNCTURE,LUMBAR, INTRATHECAL CHEMO DELIVERY N/A 10/17/2019    Procedure: Lumbar puncture with IT Chemo (not CD);  Surgeon: Reynaldo Campuzano MD;  [facility]      SPINAL PUNCTURE,LUMBAR, INTRATHECAL CHEMO DELIVERY N/A 10/24/2019    Procedure: LUMBAR PUNCTURE, WITH INTRATHECAL CHEMOTHERAPY ADMINISTRATION;  Surgeon: Félix Van, APRN CNP;  [facility]      SPINAL PUNCTURE,LUMBAR, INTRATHECAL CHEMO DELIVERY N/A 10/31/2019    Procedure: Lumbar puncture with IT Chemo (not CD);  Surgeon: Reynaldo Campuzano MD;  [facility]       SPINAL PUNCTURE,LUMBAR, INTRATHECAL CHEMO DELIVERY N/A 12/19/2019    Procedure: Lumbar puncture with IT Chem (CD);  Surgeon: Félix Van, APRN CNP;  [facility]      SPINAL PUNCTURE,LUMBAR, INTRATHECAL CHEMO DELIVERY N/A 12/23/2019    Procedure: Lumbar puncture with IT Chem (CD);  Surgeon: Heather Lopez MD;  [facility]      SPINAL PUNCTURE,LUMBAR, INTRATHECAL CHEMO DELIVERY N/A 1/23/2020    Procedure: Lumbar puncture with IT Chem (CD);  Surgeon: Reynaldo Campuzano MD;  [facility]      SPINAL PUNCTURE,LUMBAR, INTRATHECAL CHEMO DELIVERY N/A 2/20/2020    Procedure: Lumbar puncture with intrathecal Chemotherapy (CD);  Surgeon: Reynaldo Campuzano MD;  [facility]      SPINAL PUNCTURE,LUMBAR, INTRATHECAL CHEMO DELIVERY N/A 3/19/2020    Procedure: Lumbar puncture with intrathecal Chemotherapy (CD);  Surgeon: Reynaldo Campuzano MD;  [facility]      SPINAL PUNCTURE,LUMBAR, INTRATHECAL CHEMO DELIVERY N/A 3/26/2020    Procedure: Lumbar puncture with intrathecal Chemotherapy (not CD);  Surgeon: Todd Mercado MD;  [facility]      SPINAL PUNCTURE,LUMBAR, INTRATHECAL CHEMO DELIVERY N/A 4/23/2020    Procedure: Lumbar puncture with intrathecal Chemotherapy (CD);  Surgeon: Marleny Brewer MD;  [facility]      SPINAL PUNCTURE,LUMBAR, INTRATHECAL CHEMO DELIVERY N/A 5/14/2020    Procedure: Lumbar puncture with intrathecal Chemotherapy (not CD);  Surgeon: Todd Mercado MD;  [facility]      SPINAL PUNCTURE,LUMBAR, INTRATHECAL CHEMO DELIVERY N/A 7/9/2020    Procedure: Lumbar puncture with intrathecal Chemotherapy (CD);  Surgeon: Todd Mercado MD;  [facility]      SPINAL PUNCTURE,LUMBAR, INTRATHECAL CHEMO DELIVERY N/A 8/6/2020    Procedure: Lumbar puncture with intrathecal Chemotherapy (not CD);  Surgeon: Todd Mercado MD;   [facility]      SPINAL PUNCTURE,LUMBAR, INTRATHECAL CHEMO DELIVERY N/A 10/6/2020    Procedure: Lumbar puncture with intrathecal Chemotherapy (not CD);  Surgeon: Félix Van APRN CNP;  [facility]      SPINAL PUNCTURE,LUMBAR, INTRATHECAL CHEMO DELIVERY N/A 11/3/2020    Procedure: Lumbar puncture with intrathecal Chemotherapy (not CD);  Surgeon: Reynaldo Campuzano MD;  [facility]      SPINAL PUNCTURE,LUMBAR, INTRATHECAL CHEMO DELIVERY N/A 12/29/2020    Procedure: Lumbar puncture with intrathecal Chemotherapy (CD);  Surgeon: Reynaldo Campuzano MD;  [facility]      SPINAL PUNCTURE,LUMBAR, INTRATHECAL CHEMO DELIVERY N/A 2/23/2021    Procedure: Lumbar puncture with intrathecal Chemotherapy (not CD);  Surgeon: Félix Van APRN CNP;  [facility]      SPINAL PUNCTURE,LUMBAR, INTRATHECAL CHEMO DELIVERY N/A 3/23/2021    Procedure: Lumbar puncture with intrathecal Chemotherapy (not CD);  Surgeon: Marie Damon NP;  [facility]      SPINAL PUNCTURE,LUMBAR, INTRATHECAL CHEMO DELIVERY N/A 4/20/2021    Procedure: Lumbar puncture with intrathecal Chemotherapy (not CD);  Surgeon: Marie Damon NP;  [facility]      SPINAL PUNCTURE,LUMBAR, INTRATHECAL CHEMO DELIVERY N/A 6/15/2021    Procedure: Lumbar puncture with intrathecal Chemotherapy (not CD);  Surgeon: Félix Van APRN CNP;  [facility]      SPINAL PUNCTURE,LUMBAR, INTRATHECAL CHEMO DELIVERY N/A 9/7/2021    Procedure: Lumbar puncture with intrathecal Chemotherapy (not CD);  Surgeon: Reynaldo Campuzano MD;  [facility]      SPINAL PUNCTURE,LUMBAR, INTRATHECAL CHEMO DELIVERY N/A 11/30/2021    Procedure: Lumbar puncture with intrathecal Chemotherapy (not CD);  Surgeon: Félix Van APRN CNP;  [facility]      THORACENTESIS N/A 8/31/2019    Procedure: Thoracentesis;  Surgeon:  "Michell Keith MD;  [facility]       Review of Systems   Genitourinary: Positive for impotence.     CONSTITUTIONAL: NEGATIVE for fever, chills, change in weight  INTEGUMENTARY/SKIN: NEGATIVE for worrisome rashes, moles or lesions  EYES: NEGATIVE for vision changes or irritation  ENT: NEGATIVE for ear, mouth and throat problems  RESP: NEGATIVE for significant cough or SOB  CV: NEGATIVE for chest pain, palpitations or peripheral edema  GI: NEGATIVE for nausea, abdominal pain, heartburn, or change in bowel habits   male: ED, otherwise negative for dysuria, hematuria, decreased urinary stream, urethral discharge  MUSCULOSKELETAL: NEGATIVE for significant arthralgias or myalgia  NEURO: NEGATIVE for weakness, dizziness or paresthesias  PSYCHIATRIC: NEGATIVE for changes in mood or affect    OBJECTIVE:   /74   Pulse 60   Temp 97.6  F (36.4  C) (Temporal)   Resp 14   Ht 1.846 m (6' 0.68\")   Wt 93 kg (205 lb)   SpO2 98%   BMI 27.29 kg/m      Physical Exam  GENERAL: healthy, alert and no distress  EYES: Eyes grossly normal to inspection, PERRL and conjunctivae and sclerae normal  HENT: Right ear impacted cerumen.  Otherwise ear canals and TM's normal, nose and mouth without ulcers or lesions  NECK: no adenopathy, no asymmetry, masses, or scars and thyroid normal to palpation  RESP: lungs clear to auscultation - no rales, rhonchi or wheezes  CV: regular rate and rhythm, normal S1 S2, no S3 or S4, no murmur, click or rub, no peripheral edema and peripheral pulses strong  ABDOMEN: soft, nontender, no hepatosplenomegaly, no masses and bowel sounds normal  MS: no gross musculoskeletal defects noted, no edema  SKIN: no suspicious lesions or rashes  NEURO: Normal strength and tone, mentation intact and speech normal  PSYCH: mentation appears normal, affect normal/bright    Labs: pending    ASSESSMENT/PLAN:   1. Routine general medical examination at a health care facility: Discussed personal health and safety. " Routine screenings as below. Appropriate anticipatory guidance, vaccinations, and health screening recommendations delivered according to the USPSTF and other appropriate society guidelines.  Patient understands and is agreeable with the plan ordered below.   - HPV9 (GARDASIL 9)  - Neisseria gonorrhoeae PCR; Future  - Chlamydia trachomatis PCR; Future  - HIV Antigen Antibody Combo; Future  - Hepatitis C antibody; Future  - Treponema Abs w Reflex to RPR and Titer; Future  - Basic metabolic panel  (Ca, Cl, CO2, Creat, Gluc, K, Na, BUN); Future  - CBC with platelets; Future  - VACCINE ADMINISTRATION, INITIAL  - [place] REMOVAL IMPACTED CERUMEN IRRIGATION/LVG UNILAT  - Basic metabolic panel  (Ca, Cl, CO2, Creat, Gluc, K, Na, BUN)  - CBC with platelets    2. T lymphoblastic lymphoma (H): Follow with oncology.  Appointment next week.    3. Erectile dysfunction, unspecified erectile dysfunction type: Trial medication below.  No current cardiac symptoms, or other contraindication to trial of medication. Discussed proper use (30 min - 1 hour prior to sex), still needing physical/visual stimulation for medication to work, duration of action, and common side effects. Discussed using medication by himself first to determine if he will have side effects and effective dose. Discussed GoodRx coupons for better pricing.  Discussed not mixing with nitroglycerin or alcohol.  Discussed being seen in the ER for erections lasting longer than 4 hours.  Patient agreeable with this plan.  He will message me via Hands and let me know what dose is effective for him.  - tadalafil (CIALIS) 5 MG tablet; Take 1-2 tablets (5-10 mg) by mouth daily  Dispense: 30 tablet; Refill: 3    4. Impacted cerumen of right ear: Irrigated by MA staff.  - [place] REMOVAL IMPACTED CERUMEN IRRIGATION/LVG UNILAT    5. Screen for STD (sexually transmitted disease):   - Neisseria gonorrhoeae PCR; Future  - Chlamydia trachomatis PCR; Future  - HIV Antigen Antibody Combo;  "Future  - Hepatitis C antibody; Future  - Treponema Abs w Reflex to RPR and Titer; Future    6. Need for HPV vaccination  - HPV9 (GARDASIL 9)  - VACCINE ADMINISTRATION, INITIAL    7. Overweight (BMI 25.0-29.9): Encourage diet and exercise changes for overall health improvement.    COUNSELING:   Reviewed preventive health counseling, as reflected in patient instructions       Regular exercise       Healthy diet/nutrition       Vision screening       Hearing screening       Family planning       Safe sex practices/STD prevention    BMI:   Estimated body mass index is 27.29 kg/m  as calculated from the following:    Height as of this encounter: 1.846 m (6' 0.68\").    Weight as of this encounter: 93 kg (205 lb).   Weight management plan: Discussed healthy diet and exercise guidelines      He reports that he has never smoked. He has never been exposed to tobacco smoke. He has never used smokeless tobacco.  Nicotine/Tobacco Cessation Plan:   Information offered: Patient not interested at this time    Raoul Wilhelm MD  [facility]    Disclaimer: This note consists of symbols derived from keyboarding, dictation and/or voice recognition software. As a result, there may be errors in the script that have gone undetected. Please consider this when interpreting information found in this chart.  " room air

## 2023-12-27 PROBLEM — Z78.9 OTHER SPECIFIED HEALTH STATUS: Chronic | Status: ACTIVE | Noted: 2022-08-17

## 2023-12-29 LAB
ABO + RH PNL BLD: NORMAL
BASOPHILS # BLD AUTO: 0.02 K/UL
BASOPHILS NFR BLD AUTO: 0.2 %
EOSINOPHIL # BLD AUTO: 0.03 K/UL
EOSINOPHIL NFR BLD AUTO: 0.4 %
HCT VFR BLD CALC: 44.5 %
HGB BLD-MCNC: 15 G/DL
IMM GRANULOCYTES NFR BLD AUTO: 0.5 %
LYMPHOCYTES # BLD AUTO: 2.27 K/UL
LYMPHOCYTES NFR BLD AUTO: 27 %
MAN DIFF?: NORMAL
MCHC RBC-ENTMCNC: 30.2 PG
MCHC RBC-ENTMCNC: 33.7 GM/DL
MCV RBC AUTO: 89.5 FL
MONOCYTES # BLD AUTO: 0.49 K/UL
MONOCYTES NFR BLD AUTO: 5.8 %
NEUTROPHILS # BLD AUTO: 5.56 K/UL
NEUTROPHILS NFR BLD AUTO: 66.1 %
PLATELET # BLD AUTO: 326 K/UL
RBC # BLD: 4.97 M/UL
RBC # FLD: 12.9 %
WBC # FLD AUTO: 8.41 K/UL

## 2024-01-02 ENCOUNTER — APPOINTMENT (OUTPATIENT)
Dept: OBGYN | Facility: CLINIC | Age: 34
End: 2024-01-02
Payer: MEDICAID

## 2024-01-02 VITALS
DIASTOLIC BLOOD PRESSURE: 80 MMHG | BODY MASS INDEX: 29.59 KG/M2 | WEIGHT: 167 LBS | HEIGHT: 63 IN | HEART RATE: 84 BPM | RESPIRATION RATE: 20 BRPM | SYSTOLIC BLOOD PRESSURE: 140 MMHG

## 2024-01-02 DIAGNOSIS — Z33.2 ENCOUNTER FOR ELECTIVE TERMINATION OF PREGNANCY: ICD-10-CM

## 2024-01-02 PROCEDURE — 99214 OFFICE O/P EST MOD 30 MIN: CPT | Mod: 25

## 2024-01-02 PROCEDURE — 76817 TRANSVAGINAL US OBSTETRIC: CPT

## 2024-01-02 PROCEDURE — S0190: CPT

## 2024-01-02 RX ORDER — ONDANSETRON 4 MG/1
4 TABLET ORAL EVERY 6 HOURS
Qty: 4 | Refills: 0 | Status: ACTIVE | COMMUNITY
Start: 2024-01-02 | End: 1900-01-01

## 2024-01-02 RX ORDER — IBUPROFEN 600 MG/1
600 TABLET, FILM COATED ORAL
Qty: 6 | Refills: 0 | Status: ACTIVE | COMMUNITY
Start: 2024-01-02 | End: 1900-01-01

## 2024-01-02 RX ORDER — MISOPROSTOL 200 UG/1
200 TABLET ORAL
Qty: 8 | Refills: 0 | Status: ACTIVE | COMMUNITY
Start: 2024-01-02 | End: 1900-01-01

## 2024-01-02 RX ORDER — OXYCODONE AND ACETAMINOPHEN 5; 325 MG/1; MG/1
5-325 TABLET ORAL
Qty: 6 | Refills: 0 | Status: ACTIVE | COMMUNITY
Start: 2024-01-02 | End: 1900-01-01

## 2024-01-02 RX ADMIN — MIFEPRISTONE 1 MG: 200 TABLET ORAL at 00:00

## 2024-01-03 LAB
C TRACH RRNA SPEC QL NAA+PROBE: NOT DETECTED
N GONORRHOEA RRNA SPEC QL NAA+PROBE: NOT DETECTED
SOURCE AMPLIFICATION: NORMAL

## 2024-01-15 ENCOUNTER — NON-APPOINTMENT (OUTPATIENT)
Age: 34
End: 2024-01-15

## 2024-01-17 ENCOUNTER — APPOINTMENT (OUTPATIENT)
Dept: OBGYN | Facility: CLINIC | Age: 34
End: 2024-01-17
Payer: MEDICAID

## 2024-01-17 ENCOUNTER — EMERGENCY (EMERGENCY)
Facility: HOSPITAL | Age: 34
LOS: 1 days | Discharge: HOME | End: 2024-01-17
Attending: EMERGENCY MEDICINE
Payer: MEDICAID

## 2024-01-17 VITALS
TEMPERATURE: 98.9 F | DIASTOLIC BLOOD PRESSURE: 82 MMHG | HEART RATE: 71 BPM | SYSTOLIC BLOOD PRESSURE: 116 MMHG | RESPIRATION RATE: 20 BRPM

## 2024-01-17 VITALS
RESPIRATION RATE: 16 BRPM | DIASTOLIC BLOOD PRESSURE: 79 MMHG | HEART RATE: 77 BPM | SYSTOLIC BLOOD PRESSURE: 132 MMHG | TEMPERATURE: 99 F | OXYGEN SATURATION: 99 %

## 2024-01-17 VITALS — HEART RATE: 80 BPM | DIASTOLIC BLOOD PRESSURE: 76 MMHG | SYSTOLIC BLOOD PRESSURE: 115 MMHG

## 2024-01-17 VITALS — HEART RATE: 77 BPM | SYSTOLIC BLOOD PRESSURE: 117 MMHG | DIASTOLIC BLOOD PRESSURE: 80 MMHG

## 2024-01-17 VITALS
RESPIRATION RATE: 16 BRPM | OXYGEN SATURATION: 100 % | HEIGHT: 63 IN | WEIGHT: 160.06 LBS | DIASTOLIC BLOOD PRESSURE: 85 MMHG | HEART RATE: 80 BPM | SYSTOLIC BLOOD PRESSURE: 131 MMHG | TEMPERATURE: 98 F

## 2024-01-17 VITALS — HEART RATE: 90 BPM | RESPIRATION RATE: 20 BRPM | DIASTOLIC BLOOD PRESSURE: 70 MMHG | SYSTOLIC BLOOD PRESSURE: 120 MMHG

## 2024-01-17 DIAGNOSIS — Z33.2 ENCOUNTER FOR ELECTIVE TERMINATION OF PREGNANCY: ICD-10-CM

## 2024-01-17 DIAGNOSIS — O03.4 INCOMPLETE SPONTANEOUS ABORTION W/OUT COMPLICATION: ICD-10-CM

## 2024-01-17 DIAGNOSIS — Z98.89 OTHER SPECIFIED POSTPROCEDURAL STATES: Chronic | ICD-10-CM

## 2024-01-17 DIAGNOSIS — Z09 ENCOUNTER FOR FOLLOW-UP EXAMINATION AFTER COMPLETED TREATMENT FOR CONDITIONS OTHER THAN MALIGNANT NEOPLASM: ICD-10-CM

## 2024-01-17 DIAGNOSIS — N93.9 ABNORMAL UTERINE AND VAGINAL BLEEDING, UNSPECIFIED: ICD-10-CM

## 2024-01-17 LAB
ALBUMIN SERPL ELPH-MCNC: 4.6 G/DL — SIGNIFICANT CHANGE UP (ref 3.3–5)
ALP SERPL-CCNC: 49 U/L — SIGNIFICANT CHANGE UP (ref 40–120)
ALT FLD-CCNC: 19 U/L — SIGNIFICANT CHANGE UP (ref 10–45)
ANION GAP SERPL CALC-SCNC: 10 MMOL/L — SIGNIFICANT CHANGE UP (ref 5–17)
APTT BLD: 30.6 SEC — SIGNIFICANT CHANGE UP (ref 24.5–35.6)
AST SERPL-CCNC: 15 U/L — SIGNIFICANT CHANGE UP (ref 10–40)
BASOPHILS # BLD AUTO: 0.02 K/UL — SIGNIFICANT CHANGE UP (ref 0–0.2)
BASOPHILS NFR BLD AUTO: 0.3 % — SIGNIFICANT CHANGE UP (ref 0–2)
BILIRUB SERPL-MCNC: 0.6 MG/DL — SIGNIFICANT CHANGE UP (ref 0.2–1.2)
BUN SERPL-MCNC: 7 MG/DL — SIGNIFICANT CHANGE UP (ref 7–23)
CALCIUM SERPL-MCNC: 9.5 MG/DL — SIGNIFICANT CHANGE UP (ref 8.4–10.5)
CHLORIDE SERPL-SCNC: 101 MMOL/L — SIGNIFICANT CHANGE UP (ref 96–108)
CO2 SERPL-SCNC: 27 MMOL/L — SIGNIFICANT CHANGE UP (ref 22–31)
CREAT SERPL-MCNC: 0.62 MG/DL — SIGNIFICANT CHANGE UP (ref 0.5–1.3)
EGFR: 122 ML/MIN/1.73M2 — SIGNIFICANT CHANGE UP
EOSINOPHIL # BLD AUTO: 0.03 K/UL — SIGNIFICANT CHANGE UP (ref 0–0.5)
EOSINOPHIL NFR BLD AUTO: 0.4 % — SIGNIFICANT CHANGE UP (ref 0–6)
GLUCOSE SERPL-MCNC: 98 MG/DL — SIGNIFICANT CHANGE UP (ref 70–99)
HCG SERPL-ACNC: 391.1 MIU/ML — HIGH
HCT VFR BLD CALC: 38.5 % — SIGNIFICANT CHANGE UP (ref 34.5–45)
HGB BLD-MCNC: 12.9 G/DL — SIGNIFICANT CHANGE UP (ref 11.5–15.5)
IMM GRANULOCYTES NFR BLD AUTO: 0.4 % — SIGNIFICANT CHANGE UP (ref 0–0.9)
INR BLD: 1.07 RATIO — SIGNIFICANT CHANGE UP (ref 0.85–1.18)
LYMPHOCYTES # BLD AUTO: 1.48 K/UL — SIGNIFICANT CHANGE UP (ref 1–3.3)
LYMPHOCYTES # BLD AUTO: 19.7 % — SIGNIFICANT CHANGE UP (ref 13–44)
MCHC RBC-ENTMCNC: 29.8 PG — SIGNIFICANT CHANGE UP (ref 27–34)
MCHC RBC-ENTMCNC: 33.5 GM/DL — SIGNIFICANT CHANGE UP (ref 32–36)
MCV RBC AUTO: 88.9 FL — SIGNIFICANT CHANGE UP (ref 80–100)
MONOCYTES # BLD AUTO: 0.35 K/UL — SIGNIFICANT CHANGE UP (ref 0–0.9)
MONOCYTES NFR BLD AUTO: 4.7 % — SIGNIFICANT CHANGE UP (ref 2–14)
NEUTROPHILS # BLD AUTO: 5.59 K/UL — SIGNIFICANT CHANGE UP (ref 1.8–7.4)
NEUTROPHILS NFR BLD AUTO: 74.5 % — SIGNIFICANT CHANGE UP (ref 43–77)
NRBC # BLD: 0 /100 WBCS — SIGNIFICANT CHANGE UP (ref 0–0)
PLATELET # BLD AUTO: 300 K/UL — SIGNIFICANT CHANGE UP (ref 150–400)
POTASSIUM SERPL-MCNC: 3.8 MMOL/L — SIGNIFICANT CHANGE UP (ref 3.5–5.3)
POTASSIUM SERPL-SCNC: 3.8 MMOL/L — SIGNIFICANT CHANGE UP (ref 3.5–5.3)
PROT SERPL-MCNC: 7.5 G/DL — SIGNIFICANT CHANGE UP (ref 6–8.3)
PROTHROM AB SERPL-ACNC: 11.7 SEC — SIGNIFICANT CHANGE UP (ref 9.5–13)
RBC # BLD: 4.33 M/UL — SIGNIFICANT CHANGE UP (ref 3.8–5.2)
RBC # FLD: 12.7 % — SIGNIFICANT CHANGE UP (ref 10.3–14.5)
SODIUM SERPL-SCNC: 138 MMOL/L — SIGNIFICANT CHANGE UP (ref 135–145)
WBC # BLD: 7.5 K/UL — SIGNIFICANT CHANGE UP (ref 3.8–10.5)
WBC # FLD AUTO: 7.5 K/UL — SIGNIFICANT CHANGE UP (ref 3.8–10.5)

## 2024-01-17 PROCEDURE — 76817 TRANSVAGINAL US OBSTETRIC: CPT | Mod: 26

## 2024-01-17 PROCEDURE — 86850 RBC ANTIBODY SCREEN: CPT

## 2024-01-17 PROCEDURE — 85610 PROTHROMBIN TIME: CPT

## 2024-01-17 PROCEDURE — 86901 BLOOD TYPING SEROLOGIC RH(D): CPT

## 2024-01-17 PROCEDURE — 85025 COMPLETE CBC W/AUTO DIFF WBC: CPT

## 2024-01-17 PROCEDURE — 76830 TRANSVAGINAL US NON-OB: CPT | Mod: 26

## 2024-01-17 PROCEDURE — 99214 OFFICE O/P EST MOD 30 MIN: CPT

## 2024-01-17 PROCEDURE — 84702 CHORIONIC GONADOTROPIN TEST: CPT

## 2024-01-17 PROCEDURE — 93975 VASCULAR STUDY: CPT

## 2024-01-17 PROCEDURE — 99285 EMERGENCY DEPT VISIT HI MDM: CPT | Mod: 25

## 2024-01-17 PROCEDURE — 99284 EMERGENCY DEPT VISIT MOD MDM: CPT

## 2024-01-17 PROCEDURE — 93975 VASCULAR STUDY: CPT | Mod: 26

## 2024-01-17 PROCEDURE — 86900 BLOOD TYPING SEROLOGIC ABO: CPT

## 2024-01-17 PROCEDURE — 80053 COMPREHEN METABOLIC PANEL: CPT

## 2024-01-17 PROCEDURE — 99232 SBSQ HOSP IP/OBS MODERATE 35: CPT

## 2024-01-17 PROCEDURE — 76817 TRANSVAGINAL US OBSTETRIC: CPT

## 2024-01-17 PROCEDURE — 85730 THROMBOPLASTIN TIME PARTIAL: CPT

## 2024-01-17 RX ORDER — OXYCODONE HYDROCHLORIDE 5 MG/1
1 TABLET ORAL
Qty: 5 | Refills: 0
Start: 2024-01-17

## 2024-01-17 RX ORDER — OXYCODONE AND ACETAMINOPHEN 5; 325 MG/1; MG/1
5-325 TABLET ORAL
Qty: 2 | Refills: 0 | Status: ACTIVE | COMMUNITY
Start: 2024-01-17 | End: 1900-01-01

## 2024-01-17 RX ORDER — SODIUM CHLORIDE 9 MG/ML
1000 INJECTION INTRAMUSCULAR; INTRAVENOUS; SUBCUTANEOUS ONCE
Refills: 0 | Status: COMPLETED | OUTPATIENT
Start: 2024-01-17 | End: 2024-01-17

## 2024-01-17 RX ORDER — IBUPROFEN 200 MG
600 TABLET ORAL ONCE
Refills: 0 | Status: COMPLETED | OUTPATIENT
Start: 2024-01-17 | End: 2024-01-17

## 2024-01-17 RX ADMIN — Medication 600 MILLIGRAM(S): at 13:24

## 2024-01-17 RX ADMIN — SODIUM CHLORIDE 1000 MILLILITER(S): 9 INJECTION INTRAMUSCULAR; INTRAVENOUS; SUBCUTANEOUS at 13:24

## 2024-01-17 NOTE — CONSULT NOTE ADULT - SUBJECTIVE AND OBJECTIVE BOX
THAO SHIELDS  33y  Female [id number]    HPI: 34yo  LMP  s/p medAB via mife miso on - now presenting with heavy vaginal bleeding over the weekend c/f retained POC. Reports seeing large clots yesterday, presented to Dr Acosta office today, who sent her in for further workup. Patient reported feeling a little lightheaded yesterday but denies any current symptoms. Patient denies chest pain, shortness of breath, fevers, chills, nausea, vomiting, diarrhea.    OBHx: SABx1, ectopic s/p MTX x1, D&C TOPx1  GYNHx: denies fibroids, cysts, abnormal paps, STDs  PMH: none  PSH: lsc cholecystectomy, b/l inguinal hernia repair at 7yo  Meds: PNVs  All: NKDA  Soc: no substance use, anxiety/depression    accepts blood      Vital Signs Last 24 Hrs  T(C): 36.9 (2024 13:38), Max: 36.9 (2024 11:53)  T(F): 98.4 (2024 13:38), Max: 98.4 (2024 11:53)  HR: 79 (2024 13:38) (79 - 80)  BP: 129/84 (2024 13:38) (129/84 - 131/85)  BP(mean): --  RR: 16 (2024 13:38) (16 - 16)  SpO2: 99% (2024 13:38) (99% - 100%)    Parameters below as of 2024 13:38  Patient On (Oxygen Delivery Method): room air        Physical Exam:   General: sitting comfortably in bed, NAD   HEENT: neck supple, full ROM  Back: No CVA tenderness  Abd: Soft, non-tender, non-distended. NR NG  :  Spotting on pad, cervix 0/0/-3, no CMT or adnexal pain  Speculum Exam: No active bleeding from os. Bloody mucus.  Ext: non-tender b/l, no edema     LABS:                              12.9   7.50  )-----------( 300      ( 2024 14:05 )             38.5     -    138  |  101  |  7   ----------------------------<  98  3.8   |  27  |  0.62    Ca    9.5      2024 14:05    TPro  7.5  /  Alb  4.6  /  TBili  0.6  /  DBili  x   /  AST  15  /  ALT  19  /  AlkPhos  49  -    I&O's Detail    PT/INR - ( 2024 14:05 )   PT: 11.7 sec;   INR: 1.07 ratio         PTT - ( 2024 14:05 )  PTT:30.6 sec  Urinalysis Basic - ( 2024 14:05 )    Color: x / Appearance: x / SG: x / pH: x  Gluc: 98 mg/dL / Ketone: x  / Bili: x / Urobili: x   Blood: x / Protein: x / Nitrite: x   Leuk Esterase: x / RBC: x / WBC x   Sq Epi: x / Non Sq Epi: x / Bacteria: x        RADIOLOGY & ADDITIONAL STUDIES:  < from: US Transvaginal, OB (24 @ 15:50) >  Uterus: 8.5 x 4.1 x 5.4 cm. Focus of increased, mixed color Doppler   signal in the area of the posterior fundus likely representing a nidus of   vascular tissue. Peak velocity of 107.6 cm/s and low resistance waveform   consistent with retained trophoblastic tissue. Uterine cavity with   echogenic content. No intrauterine gestation.  Endometrium: 1.01 cm. Heterogeneous.    Right ovary: 3.3 cm x 2.0 cm x 2.8. Within normal limits. Normal arterial   andvenous waveforms.  Left ovary: 2.4 cm x 1.6 cm x 1.7 cm. Within normal limits. Normal   arterial and venous waveforms.    Fluid: Very trace amount of free fluid along the posterior aspect of the   uterus.    IMPRESSION:    Findings are consistent with retained products of conception located   posteriorly at the fundus.    --- End of Report ---    < end of copied text >

## 2024-01-17 NOTE — ED ADULT NURSE NOTE - OBJECTIVE STATEMENT
32 y/o F  presents to the ED from GYN office for concern for retained products of conception after medical  with Mifepristone. Patient LMP , she presented to GYN on  at 6 weeks gestation to discuss termination options as pregnancy was unplanned and undesired. Patient was prescribed  Mifepristone which was taken on . She endorses vaginal bleeding with cramping as expected. 3 days ago reports increased cramping with heavier bleeding and passage of clots. Pt advised to go to ED for US and likely D&C. Pt endorsing abdominal cramping with vaginal bleeding, went through 3 pads in an hour. A&Ox4, breathing spontaneously and unlabored on room air with even respirations, moving all extremities easily.
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
## 2024-01-17 NOTE — ED ADULT NURSE REASSESSMENT NOTE - NS ED NURSE REASSESS COMMENT FT1
OBGYN at bedside discussing next steps and plan of care for pt regarding pt's wish to stay for procedure or be seen in office tomorrow

## 2024-01-17 NOTE — ED ADULT TRIAGE NOTE - IDEAL BODY WEIGHT(KG)
52 Bed in lowest position, wheels locked, appropriate side rails in place/Call bell, personal items and telephone in reach/Instruct patient to call for assistance before getting out of bed or chair/Non-slip footwear when patient is out of bed/[place] to call system/Physically safe environment - no spills, clutter or unnecessary equipment/Purposeful Proactive Rounding/Room/bathroom lighting operational, light cord in reach
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
## 2024-01-17 NOTE — ED PROVIDER NOTE - RESPIRATORY NEGATIVE STATEMENT, MLM
no chest pain, no cough, and no shortness of breath. Comment: Invasive Detail Level: Simple Render Risk Assessment In Note?: no

## 2024-01-17 NOTE — ED ADULT TRIAGE NOTE - BP NONINVASIVE DIASTOLIC (MM HG)
It was discussed with the patient the importance of good control of their blood sugar, blood pressure, cholesterol, diet, exercise and weight under the guidance of their diabetic doctor to prevent/halt diabetic retinopathy. 85

## 2024-01-17 NOTE — ED PROVIDER NOTE - OBJECTIVE STATEMENT
33 year old female  presents to the ED from GYN office for concern for retained products of conception after medical  with Mifepristone. Patient LMP , she presented to GYN on  at 6 weeks gestation to discuss termination options as pregnancy was unplanned and undesired. Patient was prescribed  Mifepristone which was taken on . She endorses vaginal bleeding with cramping as expected. 3 days ago reports increased cramping with heavier bleeding and passage of clots. She spoke to office 2 days ago and was advised to come to the ED but reports today had scheduled follow up so waited to be evaluated in the office. Today she was seen bt her GYN and was advised to come to the ED for heavy bleeding and secondary to concern for retained products of conception on US performed in office. Pt reports cramping improved today but she has had persistent heavy bleeding sometimes needing to change her pas 3x per hour. She denies fevers, chills, chest pain, sob, n/v 33 year old female  presents to the ED from GYN office for concern for retained products of conception after medical  with Mifepristone. Patient LMP , she presented to GYN on  at 6 weeks gestation to discuss termination options as pregnancy was unplanned and undesired. Patient was prescribed  Mifepristone which was taken on . She endorses vaginal bleeding with cramping as expected. 3 days ago reports increased cramping with heavier bleeding and passage of clots. She spoke to office 2 days ago and was advised to come to the ED but reports today had scheduled follow up so waited to be evaluated in the office. Today she was seen bt her GYN and was advised to come to the ED for heavy bleeding and secondary to concern for retained products of conception on US performed in office. Pt reports cramping improved today but she has had persistent heavy bleeding sometimes needing to change her pas 3x per hour. She denies fevers, chills, chest pain, sob, n/v      Attending note.  Patient was seen in room #34 to the right.  Agree with above.  Patient was pregnant received a medication on  which caused abdominal cramping and vaginal bleeding which was heavy.  Those symptoms improved until about 2 days ago when she had increased pain and cramping and bleeding.  She was seen at OB/GYN office today and advised to come to the emergency department.  She reports having ultrasound today which showed retained products.  She denies any fevers, chills, sweats.  Patient was experiencing some lower back pain.  This is patient's fourth pregnancy.  She had 1 miscarriage, 1 termination and 1 ectopic pregnancy.  She reports no significant past medical history, and is not currently taking any prescription medication.  She has no allergies to medications.

## 2024-01-17 NOTE — ED PROVIDER NOTE - PATIENT PORTAL LINK FT
You can access the FollowMyHealth Patient Portal offered by [facility] by registering at the following website: [email or web address]. By joining [email or web address]’s FollowMyHealth portal, you will also be able to view your health information using other applications (apps) compatible with our system.

## 2024-01-17 NOTE — ED ADULT NURSE NOTE - TEMPLATE
Pt is advised.  [place] the [place] in [place].  He would also like a repeat PSA prior.     OB/GYN

## 2024-01-17 NOTE — ED PROVIDER NOTE - CLINICAL SUMMARY MEDICAL DECISION MAKING FREE TEXT BOX
Attending note.  Pregnant patient who had medical termination with worsening abdominal cramping and pain and ultrasound today which showed retained products.  Labs, urinalysis, repeat transvaginal ultrasound to confirm POC.  Analgesia.  OB/GYN consultation.  Type and Rh in addition to the labs.
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
## 2024-01-17 NOTE — ED PROVIDER NOTE - PHYSICAL EXAMINATION
CONSTITUTIONAL: Patient is awake, alert and oriented x 3. Patient is well appearing and in no acute distress  HEAD: NCAT  NECK: supple, FROM  LUNGS: CTA b/l, no wheezing or rales   HEART: RRR.+S1S2 no murmurs  ABDOMEN: Soft, non-distended, nttp, no rebound or guarding  EXTREMITY: no edema or calf tenderness b/l, FROM upper and lower ext b/l  SKIN: with no rash or lesions  NEURO: No focal deficits CONSTITUTIONAL: Patient is awake, alert and oriented x 3. Patient is well appearing and in no acute distress  HEAD: NCAT  NECK: supple, FROM  LUNGS: CTA b/l, no wheezing or rales   HEART: RRR.+S1S2 no murmurs  ABDOMEN: Soft, non-distended, nttp, no rebound or guarding  EXTREMITY: no edema or calf tenderness b/l, FROM upper and lower ext b/l  SKIN: with no rash or lesions  NEURO: No focal deficits      Attending note.  Patient is alert and in no acute distress.  There is no pallor.  Lungs are clear and equal bilaterally.  Heart is regular rhythm.  Abdomen is soft, nondistended with slight tenderness in the suprapubic area.  There is no CVA tenderness.  There is no extremity edema.  Neurologic examination is grossly intact.

## 2024-01-18 ENCOUNTER — NON-APPOINTMENT (OUTPATIENT)
Age: 34
End: 2024-01-18

## 2024-01-18 ENCOUNTER — APPOINTMENT (OUTPATIENT)
Dept: OBGYN | Facility: CLINIC | Age: 34
End: 2024-01-18

## 2024-05-05 ENCOUNTER — NON-APPOINTMENT (OUTPATIENT)
Age: 34
End: 2024-05-05

## 2024-05-21 ENCOUNTER — EMERGENCY (EMERGENCY)
Facility: HOSPITAL | Age: 34
LOS: 1 days | Discharge: HOME | End: 2024-05-21
Attending: EMERGENCY MEDICINE | Admitting: EMERGENCY MEDICINE
Payer: MEDICAID

## 2024-05-21 VITALS
HEART RATE: 80 BPM | SYSTOLIC BLOOD PRESSURE: 108 MMHG | OXYGEN SATURATION: 99 % | DIASTOLIC BLOOD PRESSURE: 75 MMHG | TEMPERATURE: 98 F | RESPIRATION RATE: 18 BRPM

## 2024-05-21 VITALS
RESPIRATION RATE: 18 BRPM | DIASTOLIC BLOOD PRESSURE: 69 MMHG | SYSTOLIC BLOOD PRESSURE: 118 MMHG | WEIGHT: 154.98 LBS | HEART RATE: 83 BPM | TEMPERATURE: 98 F | OXYGEN SATURATION: 99 %

## 2024-05-21 DIAGNOSIS — S29.012A STRAIN OF MUSCLE AND TENDON OF BACK WALL OF THORAX, INITIAL ENCOUNTER: ICD-10-CM

## 2024-05-21 DIAGNOSIS — M25.511 PAIN IN RIGHT SHOULDER: ICD-10-CM

## 2024-05-21 DIAGNOSIS — F17.200 NICOTINE DEPENDENCE, UNSPECIFIED, UNCOMPLICATED: ICD-10-CM
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
## 2024-05-21 DIAGNOSIS — Z98.89 OTHER SPECIFIED POSTPROCEDURAL STATES: Chronic | ICD-10-CM

## 2024-05-21 PROCEDURE — 99284 EMERGENCY DEPT VISIT MOD MDM: CPT

## 2024-05-21 RX ORDER — KETOROLAC TROMETHAMINE 30 MG/ML
30 SYRINGE (ML) INJECTION ONCE
Refills: 0 | Status: DISCONTINUED | OUTPATIENT
Start: 2024-05-21 | End: 2024-05-21

## 2024-05-21 RX ORDER — LIDOCAINE 4 G/100G
1 CREAM TOPICAL
Qty: 4 | Refills: 0
Start: 2024-05-21 | End: 2024-05-24

## 2024-05-21 RX ORDER — LIDOCAINE 4 G/100G
1 CREAM TOPICAL ONCE
Refills: 0 | Status: COMPLETED | OUTPATIENT
Start: 2024-05-21 | End: 2024-05-21

## 2024-05-21 RX ORDER — METHOCARBAMOL 500 MG/1
2 TABLET, FILM COATED ORAL
Qty: 24 | Refills: 0
Start: 2024-05-21 | End: 2024-05-24

## 2024-05-21 RX ORDER — METHOCARBAMOL 500 MG/1
1000 TABLET, FILM COATED ORAL ONCE
Refills: 0 | Status: COMPLETED | OUTPATIENT
Start: 2024-05-21 | End: 2024-05-21

## 2024-05-21 RX ADMIN — Medication 30 MILLIGRAM(S): at 13:44

## 2024-05-21 RX ADMIN — LIDOCAINE 1 PATCH: 4 CREAM TOPICAL at 13:44

## 2024-05-21 RX ADMIN — METHOCARBAMOL 1000 MILLIGRAM(S): 500 TABLET, FILM COATED ORAL at 13:43

## 2024-05-21 NOTE — ED PROVIDER NOTE - OBJECTIVE STATEMENT
34-year-old female with no past medical history presents emergency department for 1 month of right shoulder pain.  Patient states is worse with movement.  She also states she is having some tingling in her forearm and hand now.  Patient is taken Tylenol for pain with minimal relief.  No chest pain no shortness of breath no fever no chills.  No falls or trauma.

## 2024-05-21 NOTE — ED ADULT TRIAGE NOTE - ESI TRIAGE ACUITY LEVEL, MLM
"[facility]  [address] [place] [address]  Phone: [phone]  Fax: [phone]    History & Physical         Provider: Dr. Yvette Morgan    Patient Name: Leatha DAO (age):1935  88 y.o.           Gender: female   Phone: [phone]     Referring Physician: Dennys Linares     Vital Signs:   Height - 5'2"  Weight - 119 lb  BMI -  21.9    Plan: Capsule Endoscopy    Encounter Diagnoses   Name Primary?    Iron deficiency anemia, unspecified iron deficiency anemia type Yes    Melena            History:      Past Medical History:   Diagnosis Date    Anemia of chronic illness     Anxiety and depression     CAD (coronary artery disease)     Chronic combined systolic and diastolic CHF (congestive heart failure)     CKD (chronic kidney disease) stage 4, GFR 15-29 ml/min     Colon polyp     Dementia     Elevated cholesterol     High blood sugar     history of    History of bilateral mastectomy     History of blood clots     History of chest pain     Hyperlipidemia, group A     Hypertension     Hypertensive heart and renal disease with congestive heart failure     Microhematuria     Osteoporosis     Restless leg syndrome     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    " ASPIRIN 81 MG CHEW    aspirin 81 mg chewable tablet   Chew 1 tablet every day by oral route.    ATORVASTATIN (LIPITOR) 40 MG TABLET    Take 40 mg by mouth every evening.    AZELASTINE (ASTELIN) 137 MCG (0.1 %) NASAL SPRAY    2 sprays (274 mcg total) by Nasal route 2 (two) times daily.    CALCITRIOL (ROCALTROL) 0.5 MCG CAP    Take 0.5 mcg by mouth once daily.    CHOLESTYRAMINE (QUESTRAN) 4 GRAM PACKET    Take 1 packet by mouth 2 (two) times daily.    CLONAZEPAM (KLONOPIN) 1 MG TABLET    clonazepam 1 mg tablet   Take 1 tablet every day by oral route at bedtime.    CLOPIDOGREL (PLAVIX) 75 MG TABLET    Plavix 75 mg tablet   Take 1 tablet every day by oral route.    COLESTIPOL (COLESTID) 5 GRAM PACK    Take 5 g by mouth 2 (two) times daily.    FENOFIBRATE 160 MG TAB    TAKE 1 TABLET DAILY    FIBER CHOICE ORAL    Take by mouth 4 (four) times daily as needed.    FUROSEMIDE (LASIX) 20 MG TABLET    furosemide 20 mg tablet   Take 1 tablet every day by oral route.    ISOSORBIDE MONONITRATE (IMDUR) 30 MG 24 HR TABLET    Take 30 mg by mouth.    LORATADINE (CLARITIN) 10 MG TABLET    Take 10 mg by mouth once daily.    NITROGLYCERIN (NITROSTAT) 0.4 MG SL TABLET    Place 0.4 mg under the tongue.    ONDANSETRON (ZOFRAN) 8 MG TABLET    Take 8 mg by mouth.    PANTOPRAZOLE (PROTONIX) 40 MG TABLET    Take 1 tablet (40 mg total) by mouth once daily.    ROPINIROLE (REQUIP) 1 MG TABLET    Take 2 tablets (2 mg total) by mouth every evening.    TOVIAZ 8 MG TB24    Take 1 tablet by mouth.    TRAMADOL (ULTRAM) 50 MG TABLET    TAKE 2 TABLETS BY MOUTH EVERY TWELVE HOURS AS NEEDED FOR PAIN    VENLAFAXINE (EFFEXOR-XR) 150 MG CP24    Take 1 capsule (150 mg total) by mouth once daily.    VITAMIN D (VITAMIN D3) 1000 UNITS TAB    Take 1,000 Units by mouth once daily.    VITAMIN E 400 UNIT CAPSULE    Take 400 Units by mouth 2 (two) times daily.      Review of patient's allergies indicates:   Allergen Reactions    Ace inhibitors Other (See Comments)     Amoxicillin-pot clavulanate Other (See Comments)    Celecoxib Other (See Comments)    Codeine     Hydrocodone-acetaminophen     Irbesartan Other (See Comments)    Penicillins       Family History   Problem Relation Age of Onset    Breast cancer Mother     Heart disease Father     Epilepsy Father       Social History     Tobacco Use    Smoking status: Former    Smokeless tobacco: Never   Substance Use Topics    Alcohol use: Not Currently    Drug use: Yes     Types: Other-see comments     Comment: CBD Drops and lotion        Physical Examination:     General Appearance:___________________________  HEENT: _____________________________________  Abdomen:____________________________________  Heart:________________________________________  Lungs:_______________________________________  Extremities:___________________________________  Skin:_________________________________________  Endocrine:____________________________________  Genitourinary:_________________________________  Neurological:__________________________________      Patient has been evaluated immediately prior to sedation and is medically cleared for endoscopy with IVCS as an ASA class: ______      Physician Signature: _________________________       Date: ________  Time: ________                 4

## 2024-05-21 NOTE — ED PROVIDER NOTE - CARE PROVIDER_API CALL
Ted Ramirez  Physical/Rehab Medicine  [address] [place] [address]  Phone: [phone]  Fax: [phone]  Follow Up Time: 4-6 Days

## 2024-05-21 NOTE — ED PROVIDER NOTE - CLINICAL SUMMARY MEDICAL DECISION MAKING FREE TEXT BOX
34-year-old female presents emergency department for right shoulder pain for 1 month concerning for muscle spasm versus trapezius strain.  Patient given Toradol Robaxin and lidocaine patch instructed to follow-up with [facility] for physical therapy evaluation.

## 2024-05-21 NOTE — ED PROVIDER NOTE - NSFOLLOWUPINSTRUCTIONS_ED_ALL_ED_FT
Muscle Strain  A muscle strain is an injury that occurs when a muscle is stretched beyond its normal length. Usually, a small number of muscle fibers are torn when this happens. There are three types of muscle strains. First-degree strains have the least amount of muscle fiber tearing and the least amount of pain. Second-degree and third-degree strains have more tearing and pain.    What are the causes?  This condition is caused when a sudden, violent force is placed on a muscle and stretches it too far. This may occur with a fall, while lifting, or during sports.    What increases the risk?  This condition is more likely to develop in athletes and people who are physically active.    What are the signs or symptoms?  Symptoms of this condition include:  Pain.  Tenderness.  Bruising.  Swelling.  Trouble using the muscle.  How is this diagnosed?  This condition is diagnosed based on a physical exam and your medical history. Tests may also be done, including an X-ray, ultrasound, or MRI.    How is this treated?  This condition is initially treated with PRICE therapy. This therapy involves:  Protecting the muscle from being injured again.  Resting the injured muscle.  Icing the injured muscle.  Applying pressure (compression) to the injured muscle. This may be done with a splint or elastic bandage.  Raising (elevating) the injured muscle.  Your health care provider may also recommend medicine for pain.    Follow these instructions at home:  If you have a removable splint:    Wear the splint as told by your health care provider. Remove it only as told by your health care provider.  Check the skin around the splint every day. Tell your health care provider about any concerns.  Loosen the splint if your fingers or toes tingle, become numb, or turn cold and blue.  Keep the splint clean.  If the splint is not waterproof:  Do not let it get wet.  Cover it with a watertight covering when you take a bath or a shower.  Managing pain, stiffness, and swelling    Bag of ice on a towel on the skin.  If directed, put ice on the injured area. To do this:  If you have a removable splint, remove it as told by your health care provider.  Put ice in a plastic bag.  Place a towel between your skin and the bag.  Leave the ice on for 20 minutes, 2–3 times a day.  Remove the ice if your skin turns bright red. This is very important. If you cannot feel pain, heat, or cold, you have a greater risk of damage to the area.  Move your fingers or toes often to reduce stiffness and swelling.  Raise (elevate) the injured area above the level of your heart while you are sitting or lying down.  Wear an elastic bandage as told by your health care provider. Make sure that it is not too tight.  General instructions    Take over-the-counter and prescription medicines only as told by your health care provider. Treatment may include muscle relaxants or medicines for pain and inflammation that are taken by mouth or applied to the skin.  Restrict your activity and rest the injured muscle as told by your health care provider. Gentle movements may be allowed.  If physical therapy was prescribed, do exercises as told by your health care provider.  Do not put pressure on any part of the splint until it is fully hardened. This may take several hours.  Do not use any products that contain nicotine or tobacco. These products include cigarettes, chewing tobacco, and vaping devices, such as e-cigarettes. If you need help quitting, ask your health care provider.  Ask your health care provider when it is safe to drive if you have a splint.  Keep all follow-up visits. This is important.  How is this prevented?  Warm up before exercising. This helps to prevent future muscle strains.    Contact a health care provider if:  You have more pain or swelling in the injured area.  Get help right away if:  You have numbness or tingling in the injured area.  You lose a lot of strength in the injured area.  Summary  A muscle strain is an injury that occurs when a muscle is stretched beyond its normal length.  This condition is caused when a sudden, violent force is placed on a muscle and stretches it too far.  This condition is initially treated with PRICE therapy, which involves protecting, resting, icing, compressing, and elevating.  Gentle movements may be allowed. If physical therapy was prescribed, do exercises as told by your health care provider.  This information is not intended to replace advice given to you by your health care provider. Make sure you discuss any questions you have with your health care provider.    Document Revised: 03/07/2022 Document Reviewed: 03/07/2022  [employer] Patient Education © 2024 [employer]  [employer] logo  Terms and Conditions  Privacy Policy  Editorial Policy  All content on this site: Copyright © 2024 [employer], its licensors, and contributors. All rights are reserved, including those for text and data mining, AI training, and similar technologies. For all open access content, the Creative Commons licensing terms apply.  Cookies are used by this site. To decline or learn more, visit our Cookies page.  [employer]

## 2024-05-21 NOTE — ED PROVIDER NOTE - PATIENT PORTAL LINK FT
You can access the FollowMyHealth Patient Portal offered by [facility] by registering at the following website: [email or web address]. By joining [employer]’s FollowMyHealth portal, you will also be able to view your health information using other applications (apps) compatible with our system.

## 2024-05-21 NOTE — ED ADULT NURSE NOTE - NSFALLUNIVINTERV_ED_ALL_ED
Bed/Stretcher in lowest position, wheels locked, appropriate side rails in place/Call bell, personal items and telephone in reach/Instruct patient to call for assistance before getting out of bed/chair/stretcher/Non-slip footwear applied when patient is off stretcher/[employer] to call system/Physically safe environment - no spills, clutter or unnecessary equipment/Purposeful proactive rounding/Room/bathroom lighting operational, light cord in reach

## 2024-05-21 NOTE — ED PROVIDER NOTE - PHYSICAL EXAMINATION
Const: No apparent distress  Eyes: PERRL, no conjunctival injection  HENT:  Neck supple without meningismus   CV: RRR, Warm, well-perfused extremities  RESP: CTA B/L, no tachypnea   MSK: No gross deformities appreciated, R trapezius tenderness with knots felt, no c-spine tenderness   Skin: Warm, dry. No rashes  Neuro: Alert, CNs II-XII grossly intact. Sensation and motor function of extremities grossly intact.

## 2024-06-19 ENCOUNTER — APPOINTMENT (OUTPATIENT)
Dept: OBGYN | Facility: CLINIC | Age: 34
End: 2024-06-19
Payer: MEDICAID

## 2024-06-19 ENCOUNTER — OUTPATIENT (OUTPATIENT)
Dept: OUTPATIENT SERVICES | Facility: HOSPITAL | Age: 34
LOS: 1 days | End: 2024-06-19
Payer: MEDICAID

## 2024-06-19 DIAGNOSIS — N76.0 ACUTE VAGINITIS: ICD-10-CM

## 2024-06-19 DIAGNOSIS — Z34.90 ENCOUNTER FOR SUPERVISION OF NORMAL PREGNANCY, UNSPECIFIED, UNSPECIFIED TRIMESTER: ICD-10-CM

## 2024-06-19 DIAGNOSIS — Z98.89 OTHER SPECIFIED POSTPROCEDURAL STATES: Chronic | ICD-10-CM

## 2024-06-19 PROCEDURE — G0463: CPT

## 2024-06-19 PROCEDURE — 99212 OFFICE O/P EST SF 10 MIN: CPT

## 2024-06-19 NOTE — HISTORY OF PRESENT ILLNESS
[Definite:  ___ (Date)] : the last menstrual period was [unfilled] [Normal Amount/Duration] : was of a normal amount and duration [Spotting Between  Menses] : no spotting between menses [Sexually Active] : is sexually active [Monogamous] : is monogamous

## 2024-06-21 DIAGNOSIS — Z34.90 ENCOUNTER FOR SUPERVISION OF NORMAL PREGNANCY, UNSPECIFIED, UNSPECIFIED TRIMESTER: ICD-10-CM

## 2024-06-25 ENCOUNTER — APPOINTMENT (OUTPATIENT)
Dept: ANTEPARTUM | Facility: CLINIC | Age: 34
End: 2024-06-25

## 2024-06-28 ENCOUNTER — ASOB RESULT (OUTPATIENT)
Age: 34
End: 2024-06-28

## 2024-06-28 ENCOUNTER — APPOINTMENT (OUTPATIENT)
Dept: ANTEPARTUM | Facility: CLINIC | Age: 34
End: 2024-06-28

## 2024-06-28 PROCEDURE — 76801 OB US < 14 WKS SINGLE FETUS: CPT

## 2024-07-16 ENCOUNTER — LABORATORY RESULT (OUTPATIENT)
Age: 34
End: 2024-07-16

## 2024-07-17 ENCOUNTER — NON-APPOINTMENT (OUTPATIENT)
Age: 34
End: 2024-07-17

## 2024-07-17 ENCOUNTER — APPOINTMENT (OUTPATIENT)
Dept: OBGYN | Facility: CLINIC | Age: 34
End: 2024-07-17

## 2024-07-17 ENCOUNTER — LABORATORY RESULT (OUTPATIENT)
Age: 34
End: 2024-07-17

## 2024-07-17 ENCOUNTER — OUTPATIENT (OUTPATIENT)
Dept: OUTPATIENT SERVICES | Facility: HOSPITAL | Age: 34
LOS: 1 days | End: 2024-07-17
Payer: MEDICAID

## 2024-07-17 VITALS
WEIGHT: 161 LBS | HEIGHT: 63 IN | DIASTOLIC BLOOD PRESSURE: 80 MMHG | SYSTOLIC BLOOD PRESSURE: 112 MMHG | BODY MASS INDEX: 28.53 KG/M2

## 2024-07-17 DIAGNOSIS — Z34.00 ENCOUNTER FOR SUPERVISION OF NORMAL FIRST PREGNANCY, UNSPECIFIED TRIMESTER: ICD-10-CM

## 2024-07-17 DIAGNOSIS — Z34.80 ENCOUNTER FOR SUPERVISION OF OTHER NORMAL PREGNANCY, UNSPECIFIED TRIMESTER: ICD-10-CM

## 2024-07-17 DIAGNOSIS — Z98.89 OTHER SPECIFIED POSTPROCEDURAL STATES: Chronic | ICD-10-CM

## 2024-07-17 PROCEDURE — 81220 CFTR GENE COM VARIANTS: CPT

## 2024-07-17 PROCEDURE — 86900 BLOOD TYPING SEROLOGIC ABO: CPT

## 2024-07-17 PROCEDURE — 83020 HEMOGLOBIN ELECTROPHORESIS: CPT

## 2024-07-17 PROCEDURE — 86850 RBC ANTIBODY SCREEN: CPT

## 2024-07-17 PROCEDURE — 99213 OFFICE O/P EST LOW 20 MIN: CPT | Mod: 25

## 2024-07-17 PROCEDURE — 86480 TB TEST CELL IMMUN MEASURE: CPT

## 2024-07-17 PROCEDURE — 80053 COMPREHEN METABOLIC PANEL: CPT

## 2024-07-17 PROCEDURE — G0463: CPT

## 2024-07-17 PROCEDURE — 86780 TREPONEMA PALLIDUM: CPT

## 2024-07-17 PROCEDURE — 87591 N.GONORRHOEAE DNA AMP PROB: CPT

## 2024-07-17 PROCEDURE — 87340 HEPATITIS B SURFACE AG IA: CPT

## 2024-07-17 PROCEDURE — 97802 MEDICAL NUTRITION INDIV IN: CPT

## 2024-07-17 PROCEDURE — 87086 URINE CULTURE/COLONY COUNT: CPT

## 2024-07-17 PROCEDURE — 87624 HPV HI-RISK TYP POOLED RSLT: CPT

## 2024-07-17 PROCEDURE — 85027 COMPLETE CBC AUTOMATED: CPT

## 2024-07-17 PROCEDURE — 84443 ASSAY THYROID STIM HORMONE: CPT

## 2024-07-17 PROCEDURE — 81329 SMN1 GENE DOS/DELETION ALYS: CPT

## 2024-07-17 PROCEDURE — 86765 RUBEOLA ANTIBODY: CPT

## 2024-07-17 PROCEDURE — 83655 ASSAY OF LEAD: CPT

## 2024-07-17 PROCEDURE — 87491 CHLMYD TRACH DNA AMP PROBE: CPT

## 2024-07-17 PROCEDURE — 81243 FMR1 GEN ALY DETC ABNL ALLEL: CPT

## 2024-07-17 PROCEDURE — 86803 HEPATITIS C AB TEST: CPT

## 2024-07-17 PROCEDURE — 83020 HEMOGLOBIN ELECTROPHORESIS: CPT | Mod: 26

## 2024-07-17 PROCEDURE — 87389 HIV-1 AG W/HIV-1&-2 AB AG IA: CPT

## 2024-07-17 PROCEDURE — G0452: CPT | Mod: 26

## 2024-07-17 PROCEDURE — 83036 HEMOGLOBIN GLYCOSYLATED A1C: CPT

## 2024-07-17 PROCEDURE — 86762 RUBELLA ANTIBODY: CPT

## 2024-07-17 PROCEDURE — 82950 GLUCOSE TEST: CPT

## 2024-07-17 PROCEDURE — 81003 URINALYSIS AUTO W/O SCOPE: CPT

## 2024-07-17 RX ORDER — KRILL/OM-3/DHA/EPA/PHOSPHO/AST 1000-230MG
81 CAPSULE ORAL
Qty: 1 | Refills: 3 | Status: ACTIVE | COMMUNITY
Start: 2024-07-17 | End: 1900-01-01

## 2024-07-18 ENCOUNTER — LABORATORY RESULT (OUTPATIENT)
Age: 34
End: 2024-07-18

## 2024-07-18 LAB
A1C WITH ESTIMATED AVERAGE GLUCOSE RESULT: 5.3 % — SIGNIFICANT CHANGE UP (ref 4–5.6)
ALBUMIN SERPL ELPH-MCNC: 4.3 G/DL — SIGNIFICANT CHANGE UP (ref 3.3–5)
ALP SERPL-CCNC: 30 U/L — LOW (ref 40–120)
ALT FLD-CCNC: 15 U/L — SIGNIFICANT CHANGE UP (ref 10–45)
ANION GAP SERPL CALC-SCNC: 14 MMOL/L — SIGNIFICANT CHANGE UP (ref 5–17)
AST SERPL-CCNC: 16 U/L — SIGNIFICANT CHANGE UP (ref 10–40)
BILIRUB SERPL-MCNC: 0.3 MG/DL — SIGNIFICANT CHANGE UP (ref 0.2–1.2)
BUN SERPL-MCNC: 8 MG/DL — SIGNIFICANT CHANGE UP (ref 7–23)
C TRACH RRNA SPEC QL NAA+PROBE: SIGNIFICANT CHANGE UP
CALCIUM SERPL-MCNC: 9.2 MG/DL — SIGNIFICANT CHANGE UP (ref 8.4–10.5)
CHLORIDE SERPL-SCNC: 103 MMOL/L — SIGNIFICANT CHANGE UP (ref 96–108)
CO2 SERPL-SCNC: 20 MMOL/L — LOW (ref 22–31)
CREAT SERPL-MCNC: 0.51 MG/DL — SIGNIFICANT CHANGE UP (ref 0.5–1.3)
EGFR: 127 ML/MIN/1.73M2 — SIGNIFICANT CHANGE UP
ESTIMATED AVERAGE GLUCOSE: 105 MG/DL — SIGNIFICANT CHANGE UP (ref 68–114)
GLUCOSE 1H P MEAL SERPL-MCNC: 85 MG/DL — SIGNIFICANT CHANGE UP (ref 70–134)
GLUCOSE SERPL-MCNC: 94 MG/DL — SIGNIFICANT CHANGE UP (ref 70–99)
HBV SURFACE AG SER-ACNC: SIGNIFICANT CHANGE UP
HCT VFR BLD CALC: 41.1 % — SIGNIFICANT CHANGE UP (ref 34.5–45)
HCV AB S/CO SERPL IA: 0.06 S/CO — SIGNIFICANT CHANGE UP (ref 0–0.99)
HCV AB SERPL-IMP: SIGNIFICANT CHANGE UP
HGB BLD-MCNC: 12.7 G/DL — SIGNIFICANT CHANGE UP (ref 11.5–15.5)
HIV 1+2 AB+HIV1 P24 AG SERPL QL IA: SIGNIFICANT CHANGE UP
HPV HIGH+LOW RISK DNA PNL CVX: SIGNIFICANT CHANGE UP
LEAD BLD-MCNC: <1 UG/DL — SIGNIFICANT CHANGE UP (ref 0–3.4)
MCHC RBC-ENTMCNC: 28.5 PG — SIGNIFICANT CHANGE UP (ref 27–34)
MCHC RBC-ENTMCNC: 30.9 GM/DL — LOW (ref 32–36)
MCV RBC AUTO: 92.2 FL — SIGNIFICANT CHANGE UP (ref 80–100)
MEV IGG SER-ACNC: 12.9 AU/ML — SIGNIFICANT CHANGE UP
MEV IGG+IGM SER-IMP: NEGATIVE — SIGNIFICANT CHANGE UP
N GONORRHOEA RRNA SPEC QL NAA+PROBE: SIGNIFICANT CHANGE UP
PLATELET # BLD AUTO: 269 K/UL — SIGNIFICANT CHANGE UP (ref 150–400)
POTASSIUM SERPL-MCNC: 4.1 MMOL/L — SIGNIFICANT CHANGE UP (ref 3.5–5.3)
POTASSIUM SERPL-SCNC: 4.1 MMOL/L — SIGNIFICANT CHANGE UP (ref 3.5–5.3)
PROT SERPL-MCNC: 6.9 G/DL — SIGNIFICANT CHANGE UP (ref 6–8.3)
RBC # BLD: 4.46 M/UL — SIGNIFICANT CHANGE UP (ref 3.8–5.2)
RBC # FLD: 14 % — SIGNIFICANT CHANGE UP (ref 10.3–14.5)
RUBV IGG SER-ACNC: 0.9 INDEX — SIGNIFICANT CHANGE UP
RUBV IGG SER-IMP: NEGATIVE — SIGNIFICANT CHANGE UP
SODIUM SERPL-SCNC: 137 MMOL/L — SIGNIFICANT CHANGE UP (ref 135–145)
SPECIMEN SOURCE: SIGNIFICANT CHANGE UP
T PALLIDUM AB TITR SER: NEGATIVE — SIGNIFICANT CHANGE UP
TSH SERPL-MCNC: 1.21 UIU/ML — SIGNIFICANT CHANGE UP (ref 0.27–4.2)
WBC # BLD: 5.33 K/UL — SIGNIFICANT CHANGE UP (ref 3.8–10.5)
WBC # FLD AUTO: 5.33 K/UL — SIGNIFICANT CHANGE UP (ref 3.8–10.5)

## 2024-07-19 LAB
CULTURE RESULTS: SIGNIFICANT CHANGE UP
HEMOGLOBIN INTERPRETATION: SIGNIFICANT CHANGE UP
HGB A MFR BLD: 97.5 % — SIGNIFICANT CHANGE UP (ref 95.8–98)
HGB A2 MFR BLD: 2.5 % — SIGNIFICANT CHANGE UP (ref 2–3.2)
SPECIMEN SOURCE: SIGNIFICANT CHANGE UP

## 2024-07-20 LAB
FRAGILE X PROTEIN (FMRP) PNL BLD: SIGNIFICANT CHANGE UP
GAMMA INTERFERON BACKGROUND BLD IA-ACNC: 0.04 IU/ML — SIGNIFICANT CHANGE UP
M TB IFN-G BLD-IMP: NEGATIVE — SIGNIFICANT CHANGE UP
M TB IFN-G CD4+ BCKGRND COR BLD-ACNC: 0.01 IU/ML — SIGNIFICANT CHANGE UP
M TB IFN-G CD4+CD8+ BCKGRND COR BLD-ACNC: 0 IU/ML — SIGNIFICANT CHANGE UP
QUANT TB PLUS MITOGEN MINUS NIL: >10 IU/ML — SIGNIFICANT CHANGE UP

## 2024-07-22 ENCOUNTER — NON-APPOINTMENT (OUTPATIENT)
Age: 34
End: 2024-07-22

## 2024-07-22 ENCOUNTER — EMERGENCY (EMERGENCY)
Facility: HOSPITAL | Age: 34
LOS: 1 days | Discharge: HOME | End: 2024-07-22
Attending: STUDENT IN AN ORGANIZED HEALTH CARE EDUCATION/TRAINING PROGRAM
Payer: MEDICAID

## 2024-07-22 VITALS
TEMPERATURE: 98 F | SYSTOLIC BLOOD PRESSURE: 108 MMHG | DIASTOLIC BLOOD PRESSURE: 70 MMHG | HEART RATE: 76 BPM | OXYGEN SATURATION: 99 % | RESPIRATION RATE: 18 BRPM

## 2024-07-22 VITALS
RESPIRATION RATE: 18 BRPM | OXYGEN SATURATION: 99 % | DIASTOLIC BLOOD PRESSURE: 83 MMHG | TEMPERATURE: 98 F | WEIGHT: 160.06 LBS | SYSTOLIC BLOOD PRESSURE: 118 MMHG | HEIGHT: 63 IN | HEART RATE: 70 BPM

## 2024-07-22 DIAGNOSIS — Z34.00 ENCOUNTER FOR SUPERVISION OF NORMAL FIRST PREGNANCY, UNSPECIFIED TRIMESTER: ICD-10-CM

## 2024-07-22 DIAGNOSIS — Z98.89 UNDEFINED: Chronic | ICD-10-CM

## 2024-07-22 DIAGNOSIS — Z34.91 ENCOUNTER FOR SUPERVISION OF NORMAL PREGNANCY, UNSPECIFIED, FIRST TRIMESTER: ICD-10-CM

## 2024-07-22 LAB
ALBUMIN SERPL ELPH-MCNC: 4.1 G/DL — SIGNIFICANT CHANGE UP (ref 3.3–5)
ALP SERPL-CCNC: 32 U/L — LOW (ref 40–120)
ALT FLD-CCNC: 12 U/L — SIGNIFICANT CHANGE UP (ref 10–45)
ANION GAP SERPL CALC-SCNC: 12 MMOL/L — SIGNIFICANT CHANGE UP (ref 5–17)
APPEARANCE UR: CLEAR — SIGNIFICANT CHANGE UP
AST SERPL-CCNC: 14 U/L — SIGNIFICANT CHANGE UP (ref 10–40)
BACTERIA # UR AUTO: NEGATIVE /HPF — SIGNIFICANT CHANGE UP
BASOPHILS # BLD AUTO: 0.02 K/UL — SIGNIFICANT CHANGE UP (ref 0–0.2)
BASOPHILS NFR BLD AUTO: 0.3 % — SIGNIFICANT CHANGE UP (ref 0–2)
BILIRUB SERPL-MCNC: 0.1 MG/DL — LOW (ref 0.2–1.2)
BILIRUB UR-MCNC: NEGATIVE — SIGNIFICANT CHANGE UP
BLD GP AB SCN SERPL QL: NEGATIVE — SIGNIFICANT CHANGE UP
BUN SERPL-MCNC: 10 MG/DL — SIGNIFICANT CHANGE UP (ref 7–23)
CALCIUM SERPL-MCNC: 9.7 MG/DL — SIGNIFICANT CHANGE UP (ref 8.4–10.5)
CAST: 0 /LPF — SIGNIFICANT CHANGE UP (ref 0–4)
CHLORIDE SERPL-SCNC: 102 MMOL/L — SIGNIFICANT CHANGE UP (ref 96–108)
CO2 SERPL-SCNC: 20 MMOL/L — LOW (ref 22–31)
COLOR SPEC: YELLOW — SIGNIFICANT CHANGE UP
CREAT SERPL-MCNC: 0.46 MG/DL — LOW (ref 0.5–1.3)
CYTOLOGY SPEC DOC CYTO: SIGNIFICANT CHANGE UP
DIFF PNL FLD: ABNORMAL
EGFR: 130 ML/MIN/1.73M2 — SIGNIFICANT CHANGE UP
EOSINOPHIL # BLD AUTO: 0.11 K/UL — SIGNIFICANT CHANGE UP (ref 0–0.5)
EOSINOPHIL NFR BLD AUTO: 1.6 % — SIGNIFICANT CHANGE UP (ref 0–6)
GLUCOSE SERPL-MCNC: 96 MG/DL — SIGNIFICANT CHANGE UP (ref 70–99)
GLUCOSE UR QL: NEGATIVE MG/DL — SIGNIFICANT CHANGE UP
HCG SERPL-ACNC: HIGH MIU/ML
HCT VFR BLD CALC: 35 % — SIGNIFICANT CHANGE UP (ref 34.5–45)
HGB BLD-MCNC: 12.2 G/DL — SIGNIFICANT CHANGE UP (ref 11.5–15.5)
IMM GRANULOCYTES NFR BLD AUTO: 0.4 % — SIGNIFICANT CHANGE UP (ref 0–0.9)
KETONES UR-MCNC: NEGATIVE MG/DL — SIGNIFICANT CHANGE UP
LEUKOCYTE ESTERASE UR-ACNC: NEGATIVE — SIGNIFICANT CHANGE UP
LYMPHOCYTES # BLD AUTO: 1.84 K/UL — SIGNIFICANT CHANGE UP (ref 1–3.3)
LYMPHOCYTES # BLD AUTO: 26.1 % — SIGNIFICANT CHANGE UP (ref 13–44)
MAGNESIUM SERPL-MCNC: 1.9 MG/DL — SIGNIFICANT CHANGE UP (ref 1.6–2.6)
MCHC RBC-ENTMCNC: 29.6 PG — SIGNIFICANT CHANGE UP (ref 27–34)
MCHC RBC-ENTMCNC: 34.9 GM/DL — SIGNIFICANT CHANGE UP (ref 32–36)
MCV RBC AUTO: 85 FL — SIGNIFICANT CHANGE UP (ref 80–100)
MONOCYTES # BLD AUTO: 0.4 K/UL — SIGNIFICANT CHANGE UP (ref 0–0.9)
MONOCYTES NFR BLD AUTO: 5.7 % — SIGNIFICANT CHANGE UP (ref 2–14)
NEUTROPHILS # BLD AUTO: 4.64 K/UL — SIGNIFICANT CHANGE UP (ref 1.8–7.4)
NEUTROPHILS NFR BLD AUTO: 65.9 % — SIGNIFICANT CHANGE UP (ref 43–77)
NITRITE UR-MCNC: NEGATIVE — SIGNIFICANT CHANGE UP
NRBC # BLD: 0 /100 WBCS — SIGNIFICANT CHANGE UP (ref 0–0)
PH UR: 7 — SIGNIFICANT CHANGE UP (ref 5–8)
PLATELET # BLD AUTO: 247 K/UL — SIGNIFICANT CHANGE UP (ref 150–400)
POTASSIUM SERPL-MCNC: 3.7 MMOL/L — SIGNIFICANT CHANGE UP (ref 3.5–5.3)
POTASSIUM SERPL-SCNC: 3.7 MMOL/L — SIGNIFICANT CHANGE UP (ref 3.5–5.3)
PROT SERPL-MCNC: 7 G/DL — SIGNIFICANT CHANGE UP (ref 6–8.3)
PROT UR-MCNC: NEGATIVE MG/DL — SIGNIFICANT CHANGE UP
RBC # BLD: 4.12 M/UL — SIGNIFICANT CHANGE UP (ref 3.8–5.2)
RBC # FLD: 13.2 % — SIGNIFICANT CHANGE UP (ref 10.3–14.5)
RBC CASTS # UR COMP ASSIST: 1 /HPF — SIGNIFICANT CHANGE UP (ref 0–4)
REVIEW: SIGNIFICANT CHANGE UP
RH IG SCN BLD-IMP: POSITIVE — SIGNIFICANT CHANGE UP
SMA INTERPRETATION: SIGNIFICANT CHANGE UP
SODIUM SERPL-SCNC: 134 MMOL/L — LOW (ref 135–145)
SP GR SPEC: <1.005 — LOW (ref 1–1.03)
SQUAMOUS # UR AUTO: 0 /HPF — SIGNIFICANT CHANGE UP (ref 0–5)
UROBILINOGEN FLD QL: 0.2 MG/DL — SIGNIFICANT CHANGE UP (ref 0.2–1)
WBC # BLD: 7.04 K/UL — SIGNIFICANT CHANGE UP (ref 3.8–10.5)
WBC # FLD AUTO: 7.04 K/UL — SIGNIFICANT CHANGE UP (ref 3.8–10.5)
WBC UR QL: 0 /HPF — SIGNIFICANT CHANGE UP (ref 0–5)

## 2024-07-22 PROCEDURE — 84702 CHORIONIC GONADOTROPIN TEST: CPT

## 2024-07-22 PROCEDURE — 76815 OB US LIMITED FETUS(S): CPT | Mod: 26

## 2024-07-22 PROCEDURE — 76802 OB US < 14 WKS ADDL FETUS: CPT

## 2024-07-22 PROCEDURE — 86850 RBC ANTIBODY SCREEN: CPT

## 2024-07-22 PROCEDURE — 80053 COMPREHEN METABOLIC PANEL: CPT

## 2024-07-22 PROCEDURE — 99285 EMERGENCY DEPT VISIT HI MDM: CPT

## 2024-07-22 PROCEDURE — 76817 TRANSVAGINAL US OBSTETRIC: CPT | Mod: 26

## 2024-07-22 PROCEDURE — 85025 COMPLETE CBC W/AUTO DIFF WBC: CPT

## 2024-07-22 PROCEDURE — 99284 EMERGENCY DEPT VISIT MOD MDM: CPT | Mod: 25

## 2024-07-22 PROCEDURE — 36415 COLL VENOUS BLD VENIPUNCTURE: CPT

## 2024-07-22 PROCEDURE — 76817 TRANSVAGINAL US OBSTETRIC: CPT

## 2024-07-22 PROCEDURE — 81001 URINALYSIS AUTO W/SCOPE: CPT

## 2024-07-22 PROCEDURE — 86900 BLOOD TYPING SEROLOGIC ABO: CPT

## 2024-07-22 PROCEDURE — 86901 BLOOD TYPING SEROLOGIC RH(D): CPT

## 2024-07-22 PROCEDURE — 83735 ASSAY OF MAGNESIUM: CPT

## 2024-07-22 NOTE — ED PROVIDER NOTE - CARE PLAN
1 Principal Discharge DX:	Vaginal bleeding   Principal Discharge DX:	Vaginal bleeding  Secondary Diagnosis:	Subchorionic hematoma in first trimester  Secondary Diagnosis:	Pregnant

## 2024-07-22 NOTE — ED PROVIDER NOTE - ATTENDING CONTRIBUTION TO CARE
Attending (Wlat Sargent D.O.):  I have personally seen and examined this patient. I have performed a substantive portion of the visit including all aspects of the medical decision making. Resident note reviewed. I agree on the plan of care except where noted.    34-year-old female G5, P0 here for vaginal bleeding with lower abdominal cramping.  Denies urinary symptoms.  Not going through any menstrual Bohne pads or liners.  12 weeks pregnant.  Confirmed IUP.  No fever, no chills.  Denies trauma.    Hemodynamically stable. NAD. AAOx4 (person, place, time, event). PERRL 3mm, EOMI w/o nystagmus, well hydrated, RRR, no audible cardiac murmurs. clear lungs, no inc work of breathing. benign abdomen, - powell, no peritoneal signs, no cva ttp. no visible rashes nor deformities, no signs of jaundice, fluid overload, nor anemia. symm calves, no edema. full str/rom/neurovasc all 4 extrem. Steady gait.     Plan to eval for evidence of subchorionic hematoma, miscarriage, doubt ectopic as confirmed IUP.  Check for asymptomatic bacteriuria in pregnant patient.  Obtain labs, type and screen, transvaginal ultrasound.

## 2024-07-22 NOTE — ED ADULT NURSE NOTE - OBJECTIVE STATEMENT
33y/o Female presents to the ED c/o vaginal bleeding. Pt states she is 12 weeks pregnant, first day of LMP 2024. [id number]. Pt states she went to the bathroom this morning and passed a clot during urination. Pt denies continuos bleeding. Pt denies pain at this time and is hemodynamically stable. PMHx, , ectopic pregnancy. No pertinent medical history. Pt is AxOx4. Pt airway is patent, breathing is shallow and unlabored, skin is warm, dry, color appropriate for ethnicity. Stretcher locked and in lowest position, appropriate side rails up. Pt instructed to notify RN if assistance is needed.

## 2024-07-22 NOTE — ED PROVIDER NOTE - NSFOLLOWUPINSTRUCTIONS_ED_ALL_ED_FT
Please call your OB/GYN this morning  to discuss plan.  It may be advisable to set schedule earlier appointment.  Return to emergency department you have any signs of anemia.  Including chest pain, shortness of breath, dizziness when you stand up, soaking through 1 pad per hour for 2 hours in a row, passing another palm side blood clot.

## 2024-07-22 NOTE — ED PROVIDER NOTE - PROGRESS NOTE DETAILS
Attending Walt Sargent:  discussed with obgyn, nothing to do acutely for large subchor given hemodynamic stability. rec to f/u outpt with own obgyn. This was discussed w. patient. Patient is hemodynamically stable. All w/u, results discussed at length w/ patient. All questions answered. Strict return precautions provided w/ verbal understanding expressed. Stable for dc w/ close outpt f/u.

## 2024-07-22 NOTE — ED ADULT TRIAGE NOTE - CCCP TRG CHIEF CMPLNT
Chief Complaint   Patient presents with     Medication Problem     Patient is here to follow up with medication     Chana Montano CMA 3:20 PM on 2/26/2019.     preg/vag bleed

## 2024-07-22 NOTE — ED PROVIDER NOTE - CLINICAL SUMMARY MEDICAL DECISION MAKING FREE TEXT BOX
34-year-old female,  (ectopic, miscarried, elective term x2), currently 12 weeks pregnant,    Confirm IUP. Here for vaginal bleeding.  Per patient, earlier tonight, passed palm-sized blood clot.  Prompting patient to come into the emergency department.  No fever, no chills, no nausea, no vomiting, no urinary symptoms.  Some abdominal cramping.   Currently, not soaking through 1 menstrual pad.   No dizziness, no chest pain, no shortness of breath.  on physical exam, well-appearing, nontachycardic, abdomen soft, nontender to palpation.  Deferred pelvic exam as patient is not having heavy bleeding.   Labs, including beta hCG lab follow, type and screen ordered.  Ultrasound ordered.  Patient does not require RhoGAM.  Hemoglobin   Satisfactory.  Ultrasound showed  large subchorionic hematoma.   Discussed with OB/GYN resident.  Does not need to be seen right now as subchorionic hematoma is not actionable.   Will advise patient to call her OB/GYN this morning and discussed plan with her OB/GYN.

## 2024-07-22 NOTE — ED PROVIDER NOTE - PATIENT PORTAL LINK FT
You can access the FollowMyHealth Patient Portal offered by [facility] by registering at the following website: [email or web address]. By joining [employer]’s FollowMyHealth portal, you will also be able to view your health information using other applications (apps) compatible with our system.

## 2024-07-22 NOTE — ED ADULT NURSE NOTE - NSICDXPASTMEDICALHX_GEN_ALL_CORE_FT
PAST MEDICAL HISTORY:       No pertinent past medical history     No pertinent past medical history

## 2024-07-25 ENCOUNTER — APPOINTMENT (OUTPATIENT)
Dept: OBGYN | Facility: CLINIC | Age: 34
End: 2024-07-25
Payer: MEDICAID

## 2024-07-25 ENCOUNTER — ASOB RESULT (OUTPATIENT)
Age: 34
End: 2024-07-25

## 2024-07-25 ENCOUNTER — NON-APPOINTMENT (OUTPATIENT)
Age: 34
End: 2024-07-25

## 2024-07-25 ENCOUNTER — APPOINTMENT (OUTPATIENT)
Dept: ANTEPARTUM | Facility: CLINIC | Age: 34
End: 2024-07-25
Payer: MEDICAID

## 2024-07-25 VITALS — BODY MASS INDEX: 28.52 KG/M2 | WEIGHT: 161 LBS | DIASTOLIC BLOOD PRESSURE: 60 MMHG | SYSTOLIC BLOOD PRESSURE: 100 MMHG

## 2024-07-25 PROBLEM — O41.8X90 SUBCHORIONIC HEMATOMA: Status: ACTIVE | Noted: 2024-07-25

## 2024-07-25 PROBLEM — Z34.91 FIRST TRIMESTER PREGNANCY: Status: ACTIVE | Noted: 2024-07-16

## 2024-07-25 PROCEDURE — 76813 OB US NUCHAL MEAS 1 GEST: CPT | Mod: 59

## 2024-07-25 PROCEDURE — 76801 OB US < 14 WKS SINGLE FETUS: CPT

## 2024-07-25 PROCEDURE — 99213 OFFICE O/P EST LOW 20 MIN: CPT

## 2024-07-26 LAB — CYSTIC FIBROSIS EXPANDED PANEL: SIGNIFICANT CHANGE UP

## 2024-08-01 ENCOUNTER — OUTPATIENT (OUTPATIENT)
Dept: OUTPATIENT SERVICES | Facility: HOSPITAL | Age: 34
LOS: 1 days | End: 2024-08-01

## 2024-08-01 ENCOUNTER — APPOINTMENT (OUTPATIENT)
Dept: OBGYN | Facility: CLINIC | Age: 34
End: 2024-08-01
Payer: MEDICAID

## 2024-08-01 VITALS — HEIGHT: 63 IN | WEIGHT: 166 LBS | BODY MASS INDEX: 29.41 KG/M2

## 2024-08-01 DIAGNOSIS — O41.8X90 OTHER SPECIFIED DISORDERS OF AMNIOTIC FLUID AND MEMBRANES, UNSPECIFIED TRIMESTER, NOT APPLICABLE OR UNSPECIFIED: ICD-10-CM

## 2024-08-01 DIAGNOSIS — Z34.00 ENCOUNTER FOR SUPERVISION OF NORMAL FIRST PREGNANCY, UNSPECIFIED TRIMESTER: ICD-10-CM

## 2024-08-01 DIAGNOSIS — O03.4 INCOMPLETE SPONTANEOUS ABORTION W/OUT COMPLICATION: ICD-10-CM

## 2024-08-01 DIAGNOSIS — Z34.91 ENCOUNTER FOR SUPERVISION OF NORMAL PREGNANCY, UNSPECIFIED, FIRST TRIMESTER: ICD-10-CM

## 2024-08-01 DIAGNOSIS — O46.8X9 OTHER SPECIFIED DISORDERS OF AMNIOTIC FLUID AND MEMBRANES, UNSPECIFIED TRIMESTER, NOT APPLICABLE OR UNSPECIFIED: ICD-10-CM

## 2024-08-01 DIAGNOSIS — Z34.92 ENCOUNTER FOR SUPERVISION OF NORMAL PREGNANCY, UNSPECIFIED, SECOND TRIMESTER: ICD-10-CM

## 2024-08-01 DIAGNOSIS — Z98.89 OTHER SPECIFIED POSTPROCEDURAL STATES: Chronic | ICD-10-CM

## 2024-08-01 DIAGNOSIS — Z34.80 ENCOUNTER FOR SUPERVISION OF OTHER NORMAL PREGNANCY, UNSPECIFIED TRIMESTER: ICD-10-CM

## 2024-08-01 PROCEDURE — G0463: CPT

## 2024-08-01 PROCEDURE — 99213 OFFICE O/P EST LOW 20 MIN: CPT

## 2024-08-01 PROCEDURE — 81003 URINALYSIS AUTO W/O SCOPE: CPT

## 2024-08-07 ENCOUNTER — NON-APPOINTMENT (OUTPATIENT)
Age: 34
End: 2024-08-07

## 2024-08-08 ENCOUNTER — NON-APPOINTMENT (OUTPATIENT)
Age: 34
End: 2024-08-08

## 2024-08-13 ENCOUNTER — NON-APPOINTMENT (OUTPATIENT)
Age: 34
End: 2024-08-13

## 2024-08-13 ENCOUNTER — APPOINTMENT (OUTPATIENT)
Dept: OBGYN | Facility: CLINIC | Age: 34
End: 2024-08-13
Payer: MEDICAID

## 2024-08-13 VITALS — DIASTOLIC BLOOD PRESSURE: 76 MMHG | BODY MASS INDEX: 30.29 KG/M2 | WEIGHT: 171 LBS | SYSTOLIC BLOOD PRESSURE: 118 MMHG

## 2024-08-13 DIAGNOSIS — O46.8X9 OTHER SPECIFIED DISORDERS OF AMNIOTIC FLUID AND MEMBRANES, UNSPECIFIED TRIMESTER, NOT APPLICABLE OR UNSPECIFIED: ICD-10-CM

## 2024-08-13 DIAGNOSIS — O41.8X90 OTHER SPECIFIED DISORDERS OF AMNIOTIC FLUID AND MEMBRANES, UNSPECIFIED TRIMESTER, NOT APPLICABLE OR UNSPECIFIED: ICD-10-CM

## 2024-08-13 DIAGNOSIS — Z34.92 ENCOUNTER FOR SUPERVISION OF NORMAL PREGNANCY, UNSPECIFIED, SECOND TRIMESTER: ICD-10-CM

## 2024-08-13 DIAGNOSIS — O46.90 ANTEPARTUM HEMORRHAGE, UNSPECIFIED, UNSPECIFIED TRIMESTER: ICD-10-CM

## 2024-08-13 PROCEDURE — 99213 OFFICE O/P EST LOW 20 MIN: CPT

## 2024-08-21 ENCOUNTER — NON-APPOINTMENT (OUTPATIENT)
Age: 34
End: 2024-08-21

## 2024-08-22 ENCOUNTER — ASOB RESULT (OUTPATIENT)
Age: 34
End: 2024-08-22

## 2024-08-22 ENCOUNTER — LABORATORY RESULT (OUTPATIENT)
Age: 34
End: 2024-08-22

## 2024-08-22 ENCOUNTER — OUTPATIENT (OUTPATIENT)
Dept: OUTPATIENT SERVICES | Facility: HOSPITAL | Age: 34
LOS: 1 days | End: 2024-08-22
Payer: MEDICAID

## 2024-08-22 ENCOUNTER — APPOINTMENT (OUTPATIENT)
Dept: ANTEPARTUM | Facility: CLINIC | Age: 34
End: 2024-08-22
Payer: MEDICAID

## 2024-08-22 ENCOUNTER — APPOINTMENT (OUTPATIENT)
Dept: OBGYN | Facility: CLINIC | Age: 34
End: 2024-08-22
Payer: MEDICAID

## 2024-08-22 VITALS — WEIGHT: 171 LBS | BODY MASS INDEX: 30.29 KG/M2 | DIASTOLIC BLOOD PRESSURE: 72 MMHG | SYSTOLIC BLOOD PRESSURE: 110 MMHG

## 2024-08-22 DIAGNOSIS — Z34.90 ENCOUNTER FOR SUPERVISION OF NORMAL PREGNANCY, UNSPECIFIED, UNSPECIFIED TRIMESTER: ICD-10-CM

## 2024-08-22 DIAGNOSIS — Z98.89 OTHER SPECIFIED POSTPROCEDURAL STATES: Chronic | ICD-10-CM

## 2024-08-22 DIAGNOSIS — Z34.92 ENCOUNTER FOR SUPERVISION OF NORMAL PREGNANCY, UNSPECIFIED, SECOND TRIMESTER: ICD-10-CM

## 2024-08-22 DIAGNOSIS — Z34.80 ENCOUNTER FOR SUPERVISION OF OTHER NORMAL PREGNANCY, UNSPECIFIED TRIMESTER: ICD-10-CM

## 2024-08-22 PROCEDURE — 81003 URINALYSIS AUTO W/O SCOPE: CPT

## 2024-08-22 PROCEDURE — G0463: CPT

## 2024-08-22 PROCEDURE — 76805 OB US >/= 14 WKS SNGL FETUS: CPT

## 2024-08-22 PROCEDURE — 99213 OFFICE O/P EST LOW 20 MIN: CPT

## 2024-09-19 ENCOUNTER — ASOB RESULT (OUTPATIENT)
Age: 34
End: 2024-09-19

## 2024-09-19 ENCOUNTER — OUTPATIENT (OUTPATIENT)
Dept: OUTPATIENT SERVICES | Facility: HOSPITAL | Age: 34
LOS: 1 days | End: 2024-09-19
Payer: MEDICAID

## 2024-09-19 ENCOUNTER — APPOINTMENT (OUTPATIENT)
Dept: ANTEPARTUM | Facility: CLINIC | Age: 34
End: 2024-09-19
Payer: MEDICAID

## 2024-09-19 ENCOUNTER — APPOINTMENT (OUTPATIENT)
Dept: OBGYN | Facility: CLINIC | Age: 34
End: 2024-09-19
Payer: MEDICAID

## 2024-09-19 VITALS — WEIGHT: 172 LBS | BODY MASS INDEX: 30.47 KG/M2 | DIASTOLIC BLOOD PRESSURE: 72 MMHG | SYSTOLIC BLOOD PRESSURE: 112 MMHG

## 2024-09-19 DIAGNOSIS — Z34.80 ENCOUNTER FOR SUPERVISION OF OTHER NORMAL PREGNANCY, UNSPECIFIED TRIMESTER: ICD-10-CM

## 2024-09-19 DIAGNOSIS — Z98.89 OTHER SPECIFIED POSTPROCEDURAL STATES: Chronic | ICD-10-CM

## 2024-09-19 PROCEDURE — 99213 OFFICE O/P EST LOW 20 MIN: CPT | Mod: 25

## 2024-09-19 PROCEDURE — 76811 OB US DETAILED SNGL FETUS: CPT

## 2024-09-24 DIAGNOSIS — Z34.90 ENCOUNTER FOR SUPERVISION OF NORMAL PREGNANCY, UNSPECIFIED, UNSPECIFIED TRIMESTER: ICD-10-CM

## 2024-09-24 DIAGNOSIS — Z34.92 ENCOUNTER FOR SUPERVISION OF NORMAL PREGNANCY, UNSPECIFIED, SECOND TRIMESTER: ICD-10-CM

## 2024-09-30 ENCOUNTER — ASOB RESULT (OUTPATIENT)
Age: 34
End: 2024-09-30

## 2024-09-30 ENCOUNTER — APPOINTMENT (OUTPATIENT)
Dept: ANTEPARTUM | Facility: CLINIC | Age: 34
End: 2024-09-30
Payer: MEDICAID

## 2024-09-30 PROCEDURE — 76816 OB US FOLLOW-UP PER FETUS: CPT

## 2024-10-10 ENCOUNTER — LABORATORY RESULT (OUTPATIENT)
Age: 34
End: 2024-10-10

## 2024-10-10 ENCOUNTER — APPOINTMENT (OUTPATIENT)
Dept: OBGYN | Facility: CLINIC | Age: 34
End: 2024-10-10
Payer: MEDICAID

## 2024-10-10 ENCOUNTER — OUTPATIENT (OUTPATIENT)
Dept: OUTPATIENT SERVICES | Facility: HOSPITAL | Age: 34
LOS: 1 days | End: 2024-10-10
Payer: MEDICAID

## 2024-10-10 VITALS — BODY MASS INDEX: 32.42 KG/M2 | SYSTOLIC BLOOD PRESSURE: 110 MMHG | WEIGHT: 183 LBS | DIASTOLIC BLOOD PRESSURE: 72 MMHG

## 2024-10-10 DIAGNOSIS — O46.90 ANTEPARTUM HEMORRHAGE, UNSPECIFIED, UNSPECIFIED TRIMESTER: ICD-10-CM

## 2024-10-10 DIAGNOSIS — Z34.92 ENCOUNTER FOR SUPERVISION OF NORMAL PREGNANCY, UNSPECIFIED, SECOND TRIMESTER: ICD-10-CM

## 2024-10-10 DIAGNOSIS — Z09 ENCOUNTER FOR FOLLOW-UP EXAMINATION AFTER COMPLETED TREATMENT FOR CONDITIONS OTHER THAN MALIGNANT NEOPLASM: ICD-10-CM

## 2024-10-10 DIAGNOSIS — O41.8X90 OTHER SPECIFIED DISORDERS OF AMNIOTIC FLUID AND MEMBRANES, UNSPECIFIED TRIMESTER, NOT APPLICABLE OR UNSPECIFIED: ICD-10-CM

## 2024-10-10 DIAGNOSIS — N93.9 ABNORMAL UTERINE AND VAGINAL BLEEDING, UNSPECIFIED: ICD-10-CM

## 2024-10-10 DIAGNOSIS — Z34.90 ENCOUNTER FOR SUPERVISION OF NORMAL PREGNANCY, UNSPECIFIED, UNSPECIFIED TRIMESTER: ICD-10-CM

## 2024-10-10 DIAGNOSIS — Z98.89 OTHER SPECIFIED POSTPROCEDURAL STATES: Chronic | ICD-10-CM

## 2024-10-10 DIAGNOSIS — O46.8X9 OTHER SPECIFIED DISORDERS OF AMNIOTIC FLUID AND MEMBRANES, UNSPECIFIED TRIMESTER, NOT APPLICABLE OR UNSPECIFIED: ICD-10-CM

## 2024-10-10 LAB — GLUCOSE 1H P MEAL SERPL-MCNC: 71 MG/DL — SIGNIFICANT CHANGE UP (ref 70–134)

## 2024-10-10 PROCEDURE — 81003 URINALYSIS AUTO W/O SCOPE: CPT

## 2024-10-10 PROCEDURE — 82950 GLUCOSE TEST: CPT

## 2024-10-10 PROCEDURE — 36415 COLL VENOUS BLD VENIPUNCTURE: CPT

## 2024-10-10 PROCEDURE — G0463: CPT

## 2024-10-10 PROCEDURE — 99213 OFFICE O/P EST LOW 20 MIN: CPT | Mod: 25

## 2024-10-11 ENCOUNTER — NON-APPOINTMENT (OUTPATIENT)
Age: 34
End: 2024-10-11

## 2024-10-11 DIAGNOSIS — Z34.80 ENCOUNTER FOR SUPERVISION OF OTHER NORMAL PREGNANCY, UNSPECIFIED TRIMESTER: ICD-10-CM

## 2024-10-23 DIAGNOSIS — Z34.92 ENCOUNTER FOR SUPERVISION OF NORMAL PREGNANCY, UNSPECIFIED, SECOND TRIMESTER: ICD-10-CM

## 2024-10-30 ENCOUNTER — APPOINTMENT (OUTPATIENT)
Dept: OBGYN | Facility: CLINIC | Age: 34
End: 2024-10-30
Payer: MEDICAID

## 2024-10-30 ENCOUNTER — OUTPATIENT (OUTPATIENT)
Dept: OUTPATIENT SERVICES | Facility: HOSPITAL | Age: 34
LOS: 1 days | End: 2024-10-30
Payer: MEDICAID

## 2024-10-30 VITALS — DIASTOLIC BLOOD PRESSURE: 74 MMHG | WEIGHT: 187 LBS | SYSTOLIC BLOOD PRESSURE: 112 MMHG | BODY MASS INDEX: 33.13 KG/M2

## 2024-10-30 DIAGNOSIS — Z34.92 ENCOUNTER FOR SUPERVISION OF NORMAL PREGNANCY, UNSPECIFIED, SECOND TRIMESTER: ICD-10-CM

## 2024-10-30 DIAGNOSIS — Z34.80 ENCOUNTER FOR SUPERVISION OF OTHER NORMAL PREGNANCY, UNSPECIFIED TRIMESTER: ICD-10-CM

## 2024-10-30 DIAGNOSIS — Z98.89 OTHER SPECIFIED POSTPROCEDURAL STATES: Chronic | ICD-10-CM

## 2024-10-30 PROCEDURE — 99213 OFFICE O/P EST LOW 20 MIN: CPT | Mod: 25

## 2024-11-04 DIAGNOSIS — Z34.92 ENCOUNTER FOR SUPERVISION OF NORMAL PREGNANCY, UNSPECIFIED, SECOND TRIMESTER: ICD-10-CM

## 2024-11-18 ENCOUNTER — NON-APPOINTMENT (OUTPATIENT)
Age: 34
End: 2024-11-18

## 2024-11-21 ENCOUNTER — LABORATORY RESULT (OUTPATIENT)
Age: 34
End: 2024-11-21

## 2024-11-21 ENCOUNTER — APPOINTMENT (OUTPATIENT)
Dept: OBGYN | Facility: CLINIC | Age: 34
End: 2024-11-21
Payer: MEDICAID

## 2024-11-21 ENCOUNTER — OUTPATIENT (OUTPATIENT)
Dept: OUTPATIENT SERVICES | Facility: HOSPITAL | Age: 34
LOS: 1 days | End: 2024-11-21
Payer: MEDICAID

## 2024-11-21 VITALS — WEIGHT: 189 LBS | BODY MASS INDEX: 33.48 KG/M2 | SYSTOLIC BLOOD PRESSURE: 110 MMHG | DIASTOLIC BLOOD PRESSURE: 76 MMHG

## 2024-11-21 DIAGNOSIS — Z34.93 ENCOUNTER FOR SUPERVISION OF NORMAL PREGNANCY, UNSPECIFIED, THIRD TRIMESTER: ICD-10-CM

## 2024-11-21 DIAGNOSIS — Z98.89 OTHER SPECIFIED POSTPROCEDURAL STATES: Chronic | ICD-10-CM

## 2024-11-21 LAB
HCT VFR BLD CALC: 36.1 % — SIGNIFICANT CHANGE UP (ref 34.5–45)
HGB BLD-MCNC: 11.9 G/DL — SIGNIFICANT CHANGE UP (ref 11.5–15.5)
MCHC RBC-ENTMCNC: 29.9 PG — SIGNIFICANT CHANGE UP (ref 27–34)
MCHC RBC-ENTMCNC: 33 G/DL — SIGNIFICANT CHANGE UP (ref 32–36)
MCV RBC AUTO: 90.7 FL — SIGNIFICANT CHANGE UP (ref 80–100)
PLATELET # BLD AUTO: 251 K/UL — SIGNIFICANT CHANGE UP (ref 150–400)
RBC # BLD: 3.98 M/UL — SIGNIFICANT CHANGE UP (ref 3.8–5.2)
RBC # FLD: 13.7 % — SIGNIFICANT CHANGE UP (ref 10.3–14.5)
WBC # BLD: 5.78 K/UL — SIGNIFICANT CHANGE UP (ref 3.8–10.5)
WBC # FLD AUTO: 5.78 K/UL — SIGNIFICANT CHANGE UP (ref 3.8–10.5)

## 2024-11-21 PROCEDURE — 81003 URINALYSIS AUTO W/O SCOPE: CPT

## 2024-11-21 PROCEDURE — G0463: CPT

## 2024-11-21 PROCEDURE — 99213 OFFICE O/P EST LOW 20 MIN: CPT | Mod: 25

## 2024-11-21 PROCEDURE — 85027 COMPLETE CBC AUTOMATED: CPT

## 2024-11-21 PROCEDURE — 36415 COLL VENOUS BLD VENIPUNCTURE: CPT

## 2024-11-21 PROCEDURE — 86780 TREPONEMA PALLIDUM: CPT

## 2024-11-22 LAB — T PALLIDUM AB TITR SER: NEGATIVE — SIGNIFICANT CHANGE UP

## 2024-11-25 ENCOUNTER — NON-APPOINTMENT (OUTPATIENT)
Age: 34
End: 2024-11-25

## 2024-11-25 DIAGNOSIS — Z34.80 ENCOUNTER FOR SUPERVISION OF OTHER NORMAL PREGNANCY, UNSPECIFIED TRIMESTER: ICD-10-CM

## 2024-12-06 ENCOUNTER — NON-APPOINTMENT (OUTPATIENT)
Age: 34
End: 2024-12-06

## 2024-12-06 DIAGNOSIS — Z34.93 ENCOUNTER FOR SUPERVISION OF NORMAL PREGNANCY, UNSPECIFIED, THIRD TRIMESTER: ICD-10-CM

## 2024-12-13 NOTE — ED PROVIDER NOTE - PHYSICAL EXAMINATION
NAD, VSS, Afebrile, Lungs clear. + RUQ tender. No epigastric tender. NO lower abd tender. No CVA tender. No focal neuro deficit. PE

## 2024-12-16 ENCOUNTER — APPOINTMENT (OUTPATIENT)
Dept: ANTEPARTUM | Facility: CLINIC | Age: 34
End: 2024-12-16
Payer: MEDICAID

## 2024-12-16 ENCOUNTER — APPOINTMENT (OUTPATIENT)
Dept: OBGYN | Facility: CLINIC | Age: 34
End: 2024-12-16
Payer: MEDICAID

## 2024-12-16 ENCOUNTER — OUTPATIENT (OUTPATIENT)
Dept: OUTPATIENT SERVICES | Facility: HOSPITAL | Age: 34
LOS: 1 days | End: 2024-12-16
Payer: MEDICAID

## 2024-12-16 ENCOUNTER — ASOB RESULT (OUTPATIENT)
Age: 34
End: 2024-12-16

## 2024-12-16 VITALS
WEIGHT: 192 LBS | HEIGHT: 63 IN | BODY MASS INDEX: 34.02 KG/M2 | SYSTOLIC BLOOD PRESSURE: 112 MMHG | DIASTOLIC BLOOD PRESSURE: 70 MMHG

## 2024-12-16 DIAGNOSIS — Z34.80 ENCOUNTER FOR SUPERVISION OF OTHER NORMAL PREGNANCY, UNSPECIFIED TRIMESTER: ICD-10-CM

## 2024-12-16 DIAGNOSIS — Z23 ENCOUNTER FOR IMMUNIZATION: ICD-10-CM

## 2024-12-16 DIAGNOSIS — Z98.89 OTHER SPECIFIED POSTPROCEDURAL STATES: Chronic | ICD-10-CM

## 2024-12-16 PROCEDURE — 99213 OFFICE O/P EST LOW 20 MIN: CPT | Mod: 25

## 2024-12-16 PROCEDURE — 81003 URINALYSIS AUTO W/O SCOPE: CPT

## 2024-12-16 PROCEDURE — G0463: CPT

## 2024-12-16 PROCEDURE — 76816 OB US FOLLOW-UP PER FETUS: CPT

## 2024-12-18 DIAGNOSIS — Z34.93 ENCOUNTER FOR SUPERVISION OF NORMAL PREGNANCY, UNSPECIFIED, THIRD TRIMESTER: ICD-10-CM

## 2024-12-18 DIAGNOSIS — Z34.90 ENCOUNTER FOR SUPERVISION OF NORMAL PREGNANCY, UNSPECIFIED, UNSPECIFIED TRIMESTER: ICD-10-CM

## 2024-12-24 ENCOUNTER — NON-APPOINTMENT (OUTPATIENT)
Age: 34
End: 2024-12-24

## 2024-12-30 ENCOUNTER — OUTPATIENT (OUTPATIENT)
Dept: OUTPATIENT SERVICES | Facility: HOSPITAL | Age: 34
LOS: 1 days | End: 2024-12-30
Payer: MEDICAID

## 2024-12-30 ENCOUNTER — NON-APPOINTMENT (OUTPATIENT)
Age: 34
End: 2024-12-30

## 2024-12-30 ENCOUNTER — APPOINTMENT (OUTPATIENT)
Dept: OBGYN | Facility: CLINIC | Age: 34
End: 2024-12-30
Payer: MEDICAID

## 2024-12-30 VITALS — BODY MASS INDEX: 34.37 KG/M2 | DIASTOLIC BLOOD PRESSURE: 72 MMHG | SYSTOLIC BLOOD PRESSURE: 110 MMHG | WEIGHT: 194 LBS

## 2024-12-30 DIAGNOSIS — Z34.93 ENCOUNTER FOR SUPERVISION OF NORMAL PREGNANCY, UNSPECIFIED, THIRD TRIMESTER: ICD-10-CM

## 2024-12-30 DIAGNOSIS — Z98.89 OTHER SPECIFIED POSTPROCEDURAL STATES: Chronic | ICD-10-CM

## 2024-12-30 DIAGNOSIS — Z34.80 ENCOUNTER FOR SUPERVISION OF OTHER NORMAL PREGNANCY, UNSPECIFIED TRIMESTER: ICD-10-CM

## 2024-12-30 DIAGNOSIS — Z34.92 ENCOUNTER FOR SUPERVISION OF NORMAL PREGNANCY, UNSPECIFIED, SECOND TRIMESTER: ICD-10-CM

## 2024-12-30 PROCEDURE — 99213 OFFICE O/P EST LOW 20 MIN: CPT | Mod: 25

## 2025-01-06 DIAGNOSIS — Z34.93 ENCOUNTER FOR SUPERVISION OF NORMAL PREGNANCY, UNSPECIFIED, THIRD TRIMESTER: ICD-10-CM

## 2025-01-13 ENCOUNTER — APPOINTMENT (OUTPATIENT)
Dept: OBGYN | Facility: CLINIC | Age: 35
End: 2025-01-13

## 2025-01-13 ENCOUNTER — NON-APPOINTMENT (OUTPATIENT)
Age: 35
End: 2025-01-13

## 2025-01-13 ENCOUNTER — LABORATORY RESULT (OUTPATIENT)
Age: 35
End: 2025-01-13

## 2025-01-13 ENCOUNTER — APPOINTMENT (OUTPATIENT)
Dept: OBGYN | Facility: CLINIC | Age: 35
End: 2025-01-13
Payer: MEDICAID

## 2025-01-13 VITALS — WEIGHT: 196 LBS | DIASTOLIC BLOOD PRESSURE: 78 MMHG | SYSTOLIC BLOOD PRESSURE: 110 MMHG | BODY MASS INDEX: 34.72 KG/M2

## 2025-01-13 DIAGNOSIS — Z34.90 ENCOUNTER FOR SUPERVISION OF NORMAL PREGNANCY, UNSPECIFIED, UNSPECIFIED TRIMESTER: ICD-10-CM

## 2025-01-13 PROCEDURE — 81003 URINALYSIS AUTO W/O SCOPE: CPT

## 2025-01-13 PROCEDURE — 99213 OFFICE O/P EST LOW 20 MIN: CPT | Mod: 25

## 2025-01-13 PROCEDURE — 87389 HIV-1 AG W/HIV-1&-2 AB AG IA: CPT

## 2025-01-13 PROCEDURE — G0463: CPT

## 2025-01-14 LAB — HIV 1+2 AB+HIV1 P24 AG SERPL QL IA: SIGNIFICANT CHANGE UP

## 2025-01-16 ENCOUNTER — NON-APPOINTMENT (OUTPATIENT)
Age: 35
End: 2025-01-16